# Patient Record
Sex: FEMALE | Race: WHITE | NOT HISPANIC OR LATINO | Employment: FULL TIME | URBAN - METROPOLITAN AREA
[De-identification: names, ages, dates, MRNs, and addresses within clinical notes are randomized per-mention and may not be internally consistent; named-entity substitution may affect disease eponyms.]

---

## 2017-04-26 ENCOUNTER — TELEPHONE (OUTPATIENT)
Dept: INTERNAL MEDICINE | Facility: CLINIC | Age: 52
End: 2017-04-26

## 2017-04-26 NOTE — TELEPHONE ENCOUNTER
----- Message from Iona Barajas sent at 4/26/2017  3:47 PM EDT -----  PT NEEDS ESTRODIAL PATCH PLEASE SEND TO [facility] SHE TOLD PHARMACY SHE BROUGHT ONE IN [facility]

## 2017-04-27 ENCOUNTER — TELEPHONE (OUTPATIENT)
Dept: INTERNAL MEDICINE | Facility: CLINIC | Age: 52
End: 2017-04-27

## 2017-04-27 RX ORDER — ESTRADIOL 0.07 MG/D
1 FILM, EXTENDED RELEASE TRANSDERMAL 2 TIMES WEEKLY
Qty: 8 PATCH | Refills: 2 | Status: SHIPPED | OUTPATIENT
Start: 2017-04-27 | End: 2020-07-20 | Stop reason: ALTCHOICE

## 2017-04-27 NOTE — TELEPHONE ENCOUNTER
She said at her last appointment she spoke to you about getting the vivelle dot patch filled.  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  She will schedule an appointment, but it might me 3 months before she can get in.  I put the med in her chart.  Do you want to send in?

## 2017-04-27 NOTE — TELEPHONE ENCOUNTER
I sent in enough for 3 months, and if she can schedule a physical here, and also if she has not  Had her mammogram yet, she should get this scheduled

## 2017-06-02 ENCOUNTER — OFFICE VISIT (OUTPATIENT)
Dept: INTERNAL MEDICINE | Facility: CLINIC | Age: 52
End: 2017-06-02

## 2017-06-02 VITALS
HEIGHT: 63 IN | BODY MASS INDEX: 29.98 KG/M2 | SYSTOLIC BLOOD PRESSURE: 128 MMHG | TEMPERATURE: 98.6 F | DIASTOLIC BLOOD PRESSURE: 78 MMHG | WEIGHT: 169.2 LBS

## 2017-06-02 DIAGNOSIS — M79.671 FOOT PAIN, RIGHT: Primary | ICD-10-CM

## 2017-06-02 DIAGNOSIS — M54.2 NECK PAIN: ICD-10-CM

## 2017-06-02 DIAGNOSIS — H92.01 RIGHT EAR PAIN: ICD-10-CM

## 2017-06-02 DIAGNOSIS — F51.01 PRIMARY INSOMNIA: ICD-10-CM

## 2017-06-02 PROCEDURE — 99214 OFFICE O/P EST MOD 30 MIN: CPT | Performed by: INTERNAL MEDICINE

## 2017-06-02 RX ORDER — MOMETASONE FUROATE 50 UG/1
2 SPRAY, METERED NASAL DAILY
Qty: 3 EACH | Refills: 3 | Status: CANCELLED | OUTPATIENT
Start: 2017-06-02

## 2017-06-02 RX ORDER — METAXALONE 800 MG/1
800 TABLET ORAL 3 TIMES DAILY PRN
Qty: 270 TABLET | Refills: 1 | Status: CANCELLED | OUTPATIENT
Start: 2017-06-02

## 2017-06-02 RX ORDER — SUMATRIPTAN 100 MG/1
100 TABLET, FILM COATED ORAL
Qty: 27 TABLET | Refills: 3 | Status: CANCELLED | OUTPATIENT
Start: 2017-06-02

## 2017-06-02 RX ORDER — METAXALONE 800 MG/1
800 TABLET ORAL 3 TIMES DAILY PRN
Qty: 270 TABLET | Refills: 1 | Status: SHIPPED | OUTPATIENT
Start: 2017-06-02 | End: 2018-08-27 | Stop reason: SDUPTHER

## 2017-06-02 RX ORDER — ZOLPIDEM TARTRATE 10 MG/1
10 TABLET ORAL NIGHTLY PRN
Qty: 30 TABLET | Refills: 2 | Status: SHIPPED | OUTPATIENT
Start: 2017-06-02 | End: 2018-08-27 | Stop reason: SDUPTHER

## 2017-06-02 NOTE — PROGRESS NOTES
"Subjective   La Hernandez is a 51 y.o. female.     History of Present Illness      She has had R ear pain for the last week. Will feel a sharp pain intermittently. She did get some water in ear and so was worried she might have infection    Insomnia - needs refill on ambien. Does not take every night. Does help when she takes. Some nights will take 1/2. No caffeine in afternoon/evenings    R foot pain - started 2 months ago. Initially she felt it was from stepping off curb. She has had pain medially close to heel and feels there is some swelling below medial ankle. She has been trying acupuncture. Pain is constant when she is walking or weight-bearing. Not any worse when she first gets up, worse the more she is on it.   She starts a class at [employer] tomorrow and will have to walk a lot. She had a right ankle fracture several years ago. No otc's. Initially was more swollen so has come down. She does have appt at Dr Rollins's office at the end of the month    The following portions of the patient's history were reviewed and updated as appropriate: allergies, current medications, past family history, past medical history, past social history, past surgical history and problem list.    Review of Systems   Constitutional: Negative for activity change, appetite change and unexpected weight change.   HENT: Positive for ear pain.    Musculoskeletal: Positive for arthralgias, joint swelling and neck pain.   Allergic/Immunologic: Positive for environmental allergies. Negative for immunocompromised state.   Neurological: Positive for headaches.   Psychiatric/Behavioral: Positive for sleep disturbance.       Objective   Blood pressure 128/78, temperature 98.6 °F (37 °C), temperature source Temporal Artery , height 63\" (160 cm), weight 169 lb 3.2 oz (76.7 kg).  Physical Exam   Constitutional: She is oriented to person, place, and time. She appears well-developed and well-nourished. No distress.   HENT:   Head: Normocephalic and " atraumatic.   Right Ear: External ear normal.   Left Ear: External ear normal.   R ext canal - just slightly erythematous. Tm is clear   Eyes: Conjunctivae and EOM are normal.   Cardiovascular: Normal rate, regular rhythm and normal heart sounds.  Exam reveals no gallop and no friction rub.    No murmur heard.  Pulmonary/Chest: Effort normal and breath sounds normal. No respiratory distress. She has no wheezes.   Musculoskeletal: She exhibits tenderness (r foot - tender just past the calcaneus.no ankle tenderness, no achilles tenderness. no pain w/ dorsi/plantarflexion or eversion/inversion). She exhibits no edema.   Neurological: She is alert and oriented to person, place, and time.   Skin: Skin is warm and dry. No rash noted. She is not diaphoretic. No erythema.   Psychiatric: She has a normal mood and affect. Her behavior is normal. Judgment and thought content normal.       Assessment/Plan   La was seen today for earache, right foot pain and swelling and med refill.    Diagnoses and all orders for this visit:    Foot pain, right  -     XR Foot 3+ View Right    Neck pain  -     metaxalone (SKELAXIN) 800 MG tablet; Take 1 tablet by mouth 3 (Three) Times a Day As Needed for Muscle Spasms.    Primary insomnia - Pt has already signed controlled substance contract. Jeff done today and appropriate. UDS due  -     zolpidem (AMBIEN) 10 MG tablet; Take 1 tablet by mouth At Night As Needed for Sleep.    Right ear pain - she will call if this worsens over weekend and I will call in some drops

## 2017-06-08 ENCOUNTER — HOSPITAL ENCOUNTER (OUTPATIENT)
Dept: GENERAL RADIOLOGY | Facility: HOSPITAL | Age: 52
Discharge: HOME | End: 2017-06-08

## 2017-06-30 ENCOUNTER — OFFICE VISIT (OUTPATIENT)
Dept: ORTHOPEDIC SURGERY | Facility: CLINIC | Age: 52
End: 2017-06-30

## 2017-06-30 VITALS
SYSTOLIC BLOOD PRESSURE: 151 MMHG | DIASTOLIC BLOOD PRESSURE: 72 MMHG | HEIGHT: 63 IN | WEIGHT: 164 LBS | BODY MASS INDEX: 29.06 KG/M2 | HEART RATE: 76 BPM

## 2017-06-30 DIAGNOSIS — M72.2 PLANTAR FASCIITIS OF RIGHT FOOT: Primary | ICD-10-CM

## 2017-06-30 PROCEDURE — 99203 OFFICE O/P NEW LOW 30 MIN: CPT | Performed by: PHYSICIAN ASSISTANT

## 2017-06-30 NOTE — PROGRESS NOTES
Subjective     Pain of the Right Foot      La Hernandez is a 52 y.o. female.     History of Present Illness   This is a very pleasant 43 yo female here to discuss her right heel pain.  She complains of pain for 3 months.  No trauma or injury. She has some radiating pain into the arch.  No swelling, warmth, erythema.  Pain is worst after driving long distances and getting out of the car.   She describes the pain as sharp and 6/10. She has treated with shoe wear change. She wore a 3D walker that she had for previous injury which did not help her pain.  She has been night splinting. She begins school in 6 weeks and needs to have improved pain by then.  Allergies   Allergen Reactions   • Bactrim [Sulfamethoxazole-Trimethoprim] Hives   • Glen Plus [Aspirin Buffered] Hives   • Biaxin Oral Suspension [Clarithromycin] Other (See Comments)     Burning mouth   • Hydrocodone-Ibuprofen Hives   • Valium [Diazepam] Hives   • Wellbutrin [Bupropion] Hives   • Penicillin V Potassium [Penicillin V] Rash     Current Outpatient Prescriptions on File Prior to Visit   Medication Sig Dispense Refill   • desloratadine (CLARINEX) 5 MG tablet Take 1 tablet by mouth Daily. 90 tablet 3   • EPINEPHrine (EPIPEN) 0.3 MG/0.3ML solution auto-injector injection Inject 0.3 mg under the skin 1 (One) Time.     • estradiol (MINIVELLE, VIVELLE-DOT) 0.075 MG/24HR patch Place 1 patch on the skin 2 (Two) Times a Week. 8 patch 2   • metaxalone (SKELAXIN) 800 MG tablet Take 1 tablet by mouth 3 (Three) Times a Day As Needed for Muscle Spasms. 270 tablet 1   • SUMAtriptan (IMITREX) 100 MG tablet Take 1 tablet by mouth Every 2 (Two) Hours As Needed for migraine. 27 tablet 3   • zolpidem (AMBIEN) 10 MG tablet Take 1 tablet by mouth At Night As Needed for Sleep. 30 tablet 2   • [DISCONTINUED] mometasone (NASONEX) 50 MCG/ACT nasal spray 2 sprays into each nostril Daily. 3 each 3   • [DISCONTINUED] omeprazole-sodium bicarbonate (ZEGERID)  MG per  capsule Take 1 capsule by mouth Every Morning Before Breakfast. 90 capsule 3     No current facility-administered medications on file prior to visit.      Social History     Social History   • Marital status:      Spouse name: N/A   • Number of children: N/A   • Years of education: N/A     Occupational History   • Not on file.     Social History Main Topics   • Smoking status: Never Smoker   • Smokeless tobacco: Never Used   • Alcohol use Yes      Comment: rare   • Drug use: No   • Sexual activity: Defer     Other Topics Concern   • Not on file     Social History Narrative     Past Surgical History:   Procedure Laterality Date   • CHOLECYSTECTOMY     • ENDOSCOPY  2007    Hiatal hernia, small polyp, gastritis   • REDUCTION MAMMAPLASTY       Family History   Problem Relation Age of Onset   • Hyperlipidemia Mother    • Hypertension Mother    • Other Mother      DDD   • Osteoarthritis Mother    • Asthma Mother    • Allergies Mother    • Cancer Mother    • Multiple myeloma Father    • Stroke Father    • Coronary artery disease Father 71   • Cancer Father    • Stomach cancer Maternal Grandfather    • Colon cancer Other 55   • Diabetes Other      Past Medical History:   Diagnosis Date   • DDD (degenerative disc disease), cervical 03/2013    c spine x-ray, c5-c6, c6 -c7 narrowing, osteophytes   • Encounter for IUD removal     embedded in uterus   • Endometriosis    • Endometriosis 1999    s/p lap   • Gastritis    • H. pylori infection 2007    by serum, s/p tx - 2009 neg breath test   • H/O mammogram 08/2015    L mass - need US - [place], annual screening   • History of CT scan 2007    pancreas fullness - no change 1/2008, 9/2009   • History of MRI 12/2014    Cspine - diffuse spondylosis   • Pap smear for cervical cancer screening 05/28/2013         Review of Systems   Constitutional: Negative.    HENT: Negative.    Eyes: Negative.    Respiratory: Negative.    Cardiovascular: Negative.    Gastrointestinal: Negative.   "  Endocrine: Negative.    Genitourinary: Negative.    Musculoskeletal: Positive for arthralgias.   Skin: Negative.    Allergic/Immunologic: Positive for environmental allergies and food allergies.   Neurological: Negative.    Hematological: Negative.    Psychiatric/Behavioral: Negative.        The following portions of the patient's history were reviewed and updated as appropriate: allergies, current medications, past family history, past medical history, past social history, past surgical history and problem list.    Objective   /72  Pulse 76  Ht 63\" (160 cm)  Wt 164 lb (74.4 kg)  BMI 29.05 kg/m2    Physical Exam:   GENERAL: Body habitus: normal weight for height    Lower extremity edema: Left: none; Right: none    Varicose veins:  Left: none; Right: none    Gait: normal     Mental Status:  awake and alert; oriented to person, place, and time    Voice:  clear  SKIN:  Normal    Hair Growth:  Right:normal; Left:  normal  HEENT: Head: Normocephalic, no lesions, without obvious abnormality.     Eyes: sclera anicteric  PULM:  Repiratory effort normal    Ortho Exam  V:  Dorsalis Pedis:  Right: 2+; Left:2+    Posterior Tibial: Right:2+; Left:2+    Capillary Refill:  Brisk  MSK:  Hand:right handed      Tibia:  Right:  non tender; Left:  non tender      Ankle:  Right: non tender; Left:  non tender      Foot:  Right:  tender over the origin of the plantar fascia and into arch; Left:  non tender      NEURO: Heel Walking:  Right:  normal; Left:  normal    Toe Walking:  Right:  normal; Left:  normal     Gadsden-Sanya 5.07 monofilament test: normal    Lower extremity sensation: intact     Calf Atrophy:none    Motor Function: all 5/5          Medical Decision Making    Data Review:   none    Assessment and Plan/ Diagnosis/Treatment options:   Right plantar fasciitis.  I explained plantar fasciitis in detail to the patient.  I explained to the bow-string mechanism of the plantar fascia.  I went over the current " "research of the [employer] with them.  I explained the treatments that were not statistically significant in improving the problem including: injection of steroids, special orthotics, special shoes, surgery, medication, ice, not working, etc.    I explained the treatments that are statistically significant at improving the problem.  These include stretching/night splinting, casting, PRP, OssaTron.    I explained the most important treatment: Stretching and night splinting.  I went over the patient information sheet with them, I showed them the stretches and they were able to reproduce them.  I emphasized the \"toe pull\" as the most important stretch.  They need to do the stretches 5 repetitions each, 6-8 times per day.  I explained how to do them at work, at home, before getting out of bed, before getting out of the car, and before getting up from a chair.    She has a night splint at home already.    If they do not improve after 4 months of aggressive stretching and night splinting, the next step will be a short leg fiberglass walking cast worn for 6 weeks.    · Preprinted education was provided to the patient.     She will begin an aggressive stretching and night splinting routine and return in 4 months for casting if needed.      .            "

## 2017-11-27 RX ORDER — DESLORATADINE 5 MG/1
TABLET ORAL
Qty: 90 TABLET | Refills: 1 | Status: SHIPPED | OUTPATIENT
Start: 2017-11-27 | End: 2018-05-29 | Stop reason: SDUPTHER

## 2018-02-02 RX ORDER — SUMATRIPTAN 100 MG/1
TABLET, FILM COATED ORAL
Qty: 12 TABLET | Refills: 4 | Status: SHIPPED | OUTPATIENT
Start: 2018-02-02 | End: 2018-07-10 | Stop reason: SDUPTHER

## 2018-05-29 RX ORDER — DESLORATADINE 5 MG/1
TABLET ORAL
Qty: 30 TABLET | Refills: 0 | Status: SHIPPED | OUTPATIENT
Start: 2018-05-29 | End: 2018-08-08 | Stop reason: SDUPTHER

## 2018-07-02 RX ORDER — DESLORATADINE 5 MG/1
TABLET ORAL
Qty: 30 TABLET | Refills: 0 | OUTPATIENT
Start: 2018-07-02

## 2018-07-09 RX ORDER — DESLORATADINE 5 MG/1
TABLET ORAL
Qty: 30 TABLET | Refills: 0 | Status: SHIPPED | OUTPATIENT
Start: 2018-07-09 | End: 2018-08-08 | Stop reason: SDUPTHER

## 2018-07-10 RX ORDER — SUMATRIPTAN 100 MG/1
TABLET, FILM COATED ORAL
Qty: 12 TABLET | Refills: 0 | Status: SHIPPED | OUTPATIENT
Start: 2018-07-10 | End: 2018-08-08 | Stop reason: SDUPTHER

## 2018-08-08 ENCOUNTER — TELEPHONE (OUTPATIENT)
Dept: INTERNAL MEDICINE | Facility: CLINIC | Age: 53
End: 2018-08-08

## 2018-08-08 RX ORDER — DESLORATADINE 5 MG/1
5 TABLET ORAL DAILY
Qty: 30 TABLET | Refills: 0 | Status: SHIPPED | OUTPATIENT
Start: 2018-08-08 | End: 2018-08-27 | Stop reason: SDUPTHER

## 2018-08-08 RX ORDER — SUMATRIPTAN 100 MG/1
100 TABLET, FILM COATED ORAL ONCE AS NEEDED
Qty: 12 TABLET | Refills: 0 | Status: SHIPPED | OUTPATIENT
Start: 2018-08-08 | End: 2018-08-27 | Stop reason: SDUPTHER

## 2018-08-08 NOTE — TELEPHONE ENCOUNTER
Patient made apt on 8/27 to get refills she was offered earlier appointments than the 27th but could not come due to her schedule. So she is asking for refills to do her until this appointment on the 27th. SUMAtriptan (IMITREX) 100 MG tablet,desloratadine (CLARINEX) 5 MG tablet . She also wants us to call her if this can be done.

## 2018-08-13 RX ORDER — DESLORATADINE 5 MG/1
TABLET ORAL
Qty: 30 TABLET | Refills: 0 | OUTPATIENT
Start: 2018-08-13

## 2018-08-26 NOTE — PROGRESS NOTES
Subjective   La Hernandez is a 53 y.o. female.     History of Present Illness     Here for f/u on:    Allergies -needs refills  on clarinex and nasonex. She is out of nasonex and using astepro instead, but likes the nasonex better.she tried holding the clarinex as she felt a lot of dryness in her nasal passages but didn't help.has tried sinus rinses but didn't work. She has seen ENT in the past but has been a few years - she didn't feel they could help her  Keeps epipen on hand as well and needs refill. She is allergic to cabbage and some other foods    Insomnia - has used ambien in the past. She has tried melatonin and benadryl in the past and didn't help that much. When she took the ambien she did not feel hungover. Does not use often. Last refilled last year    Migraines - the HAs seem to come from neck if the neck pain gets out of control. imitrex does help when she takes, and wants to take daily as she feels she needs it daily.  She uses skelaxin 1/day for her neck, which also does help. She says she had tried elavil - made her drowsy. topamax - didn't feel well w/ this. Gabapentin - didn't like. Injections - didn't help.  She had seen Dr Cabrera in past - no help;  Saw pain management and did injections in neck - no help    Neck pain on left side that radiates into head - uses skelaxin daily.  She had seen ortho and NS in past but didn't recommend surgery. She also sees acupuncturist every 2 weeks which helps some    GERD - on omeprazole that she takes about every 3 days and works well; tries to watch diet      The following portions of the patient's history were reviewed and updated as appropriate: allergies, current medications, past family history, past medical history, past social history, past surgical history and problem list.    Review of Systems   Constitutional: Negative for unexpected weight gain and unexpected weight loss.   HENT: Positive for congestion, ear pain (R), sinus pressure and  "tinnitus (R). Negative for sore throat.    Gastrointestinal: Negative for constipation and diarrhea.   Musculoskeletal: Positive for neck pain.   Allergic/Immunologic: Positive for environmental allergies. Negative for immunocompromised state.   Neurological: Positive for headache. Negative for seizures and syncope.   Psychiatric/Behavioral: Positive for sleep disturbance and stress.       Objective    Vitals:    08/27/18 1535   BP: 124/74   BP Location: Left arm   Pulse: 81   Temp: 98.8 °F (37.1 °C)   TempSrc: Temporal Artery    SpO2: 95%   Weight: 73 kg (161 lb)   Height: 160 cm (63\")     Physical Exam   Constitutional: She is oriented to person, place, and time. She appears well-developed and well-nourished. No distress.   HENT:   Head: Normocephalic and atraumatic.   Right Ear: External ear normal.   Left Ear: External ear normal.   Mouth/Throat: Oropharynx is clear and moist. No oropharyngeal exudate.   L maxillary sinus tender   Eyes: Pupils are equal, round, and reactive to light. EOM are normal.   Neck: Normal range of motion. Neck supple.   Cardiovascular: Normal rate and regular rhythm.    Pulmonary/Chest: Effort normal and breath sounds normal.   Abdominal: Soft. Bowel sounds are normal.   Musculoskeletal: She exhibits tenderness (over left trap. no cspinet enderness). She exhibits no edema.   Neurological: She is alert and oriented to person, place, and time. No cranial nerve deficit.   Skin: Skin is warm and dry. No rash noted. She is not diaphoretic.   Psychiatric: She has a normal mood and affect. Her behavior is normal. Judgment and thought content normal.         Assessment/Plan   La was seen today for follow-up and insomnia.    Diagnoses and all orders for this visit:      DDD (degenerative disc disease), cervical/ Neck pain, chronic - continue acupuncture and skelaxin. She declines seeing pain mment again  -     metaxalone (SKELAXIN) 800 MG tablet; 1 qd prn    Primary insomnia - uses ambien " infrequentlyControlled substance contract reviewed and signed by pt. Adverse effects and risks of addiction of medication reviewed with pt. Jeff done today and appropriate.   -     zolpidem (AMBIEN) 10 MG tablet; Take 1 tablet by mouth At Night As Needed for Sleep.    Migraine without aura and without status migrainosus, not intractable - does not want to try any prophylactic meds again. Is willing to see neurology again to discuss options.   -     SUMAtriptan (IMITREX) 100 MG tablet; 1 qd prn headache; can repeat in 2 hrs if needed  -     Ambulatory Referral to Neurology    Gastroesophageal reflux disease, esophagitis presence not specified  -     omeprazole-sodium bicarbonate (ZEGERID)  MG per capsule; Take 1 capsule by mouth Every Morning Before Breakfast.      Chronic seasonal allergic rhinitis, unspecified trigger  -     EPINEPHrine (EPIPEN) 0.3 MG/0.3ML solution auto-injector injection; Inject 0.3 mL under the skin into the appropriate area as directed 1 (One) Time for 1 dose.  -     desloratadine (CLARINEX) 5 MG tablet; Take 1 tablet by mouth Daily.  -     mometasone (NASONEX) 50 MCG/ACT nasal spray; 2 sprays into the nostril(s) as directed by provider Daily.        Preventative - she will schedule her maritza through her GYN (RIVKA). paps through GYN in 2 weeks. Her colon is overdue - she wants to call back for this as she needs to find a   She wants to check w/ her GYN and get labs there as she is not fasting today and does want FLP done and has eaten today

## 2018-08-27 ENCOUNTER — OFFICE VISIT (OUTPATIENT)
Dept: INTERNAL MEDICINE | Facility: CLINIC | Age: 53
End: 2018-08-27

## 2018-08-27 VITALS
TEMPERATURE: 98.8 F | HEIGHT: 63 IN | DIASTOLIC BLOOD PRESSURE: 74 MMHG | HEART RATE: 81 BPM | OXYGEN SATURATION: 95 % | SYSTOLIC BLOOD PRESSURE: 124 MMHG | BODY MASS INDEX: 28.53 KG/M2 | WEIGHT: 161 LBS

## 2018-08-27 DIAGNOSIS — M54.2 NECK PAIN: Primary | ICD-10-CM

## 2018-08-27 DIAGNOSIS — G43.009 MIGRAINE WITHOUT AURA AND WITHOUT STATUS MIGRAINOSUS, NOT INTRACTABLE: ICD-10-CM

## 2018-08-27 DIAGNOSIS — K21.9 GASTROESOPHAGEAL REFLUX DISEASE, ESOPHAGITIS PRESENCE NOT SPECIFIED: ICD-10-CM

## 2018-08-27 DIAGNOSIS — J30.2 CHRONIC SEASONAL ALLERGIC RHINITIS, UNSPECIFIED TRIGGER: ICD-10-CM

## 2018-08-27 DIAGNOSIS — M50.30 DDD (DEGENERATIVE DISC DISEASE), CERVICAL: ICD-10-CM

## 2018-08-27 DIAGNOSIS — F51.01 PRIMARY INSOMNIA: ICD-10-CM

## 2018-08-27 PROCEDURE — 99214 OFFICE O/P EST MOD 30 MIN: CPT | Performed by: INTERNAL MEDICINE

## 2018-08-27 RX ORDER — METAXALONE 800 MG/1
TABLET ORAL
Qty: 90 TABLET | Refills: 3 | Status: SHIPPED | OUTPATIENT
Start: 2018-08-27 | End: 2020-04-06 | Stop reason: SDUPTHER

## 2018-08-27 RX ORDER — ZOLPIDEM TARTRATE 10 MG/1
10 TABLET ORAL NIGHTLY PRN
Qty: 30 TABLET | Refills: 2 | Status: SHIPPED | OUTPATIENT
Start: 2018-08-27 | End: 2020-04-06 | Stop reason: SDUPTHER

## 2018-08-27 RX ORDER — DESLORATADINE 5 MG/1
5 TABLET ORAL DAILY
Qty: 30 TABLET | Refills: 11 | Status: SHIPPED | OUTPATIENT
Start: 2018-08-27 | End: 2019-10-07 | Stop reason: SDUPTHER

## 2018-08-27 RX ORDER — OMEPRAZOLE AND SODIUM BICARBONATE 40; 1100 MG/1; MG/1
1 CAPSULE ORAL
Qty: 30 CAPSULE | Refills: 11 | Status: SHIPPED | OUTPATIENT
Start: 2018-08-27 | End: 2020-04-06 | Stop reason: SDUPTHER

## 2018-08-27 RX ORDER — EPINEPHRINE 0.3 MG/.3ML
0.3 INJECTION SUBCUTANEOUS ONCE
Qty: 1 EACH | Refills: 11 | Status: SHIPPED | OUTPATIENT
Start: 2018-08-27 | End: 2018-08-27

## 2018-08-27 RX ORDER — MOMETASONE FUROATE 50 UG/1
2 SPRAY, METERED NASAL DAILY
Qty: 1 EACH | Refills: 11 | Status: SHIPPED | OUTPATIENT
Start: 2018-08-27 | End: 2020-04-06 | Stop reason: SDUPTHER

## 2018-08-27 RX ORDER — OMEPRAZOLE AND SODIUM BICARBONATE 40; 1100 MG/1; MG/1
1 CAPSULE ORAL
COMMUNITY
End: 2018-08-27 | Stop reason: SDUPTHER

## 2018-08-27 RX ORDER — SUMATRIPTAN 100 MG/1
TABLET, FILM COATED ORAL
Qty: 12 TABLET | Refills: 11 | Status: SHIPPED | OUTPATIENT
Start: 2018-08-27 | End: 2020-04-06

## 2018-08-27 RX ORDER — MOMETASONE FUROATE 50 UG/1
2 SPRAY, METERED NASAL DAILY
COMMUNITY
End: 2018-08-27 | Stop reason: SDUPTHER

## 2018-10-30 ENCOUNTER — OFFICE VISIT (OUTPATIENT)
Dept: NEUROLOGY | Facility: CLINIC | Age: 53
End: 2018-10-30

## 2018-10-30 VITALS
SYSTOLIC BLOOD PRESSURE: 127 MMHG | DIASTOLIC BLOOD PRESSURE: 82 MMHG | BODY MASS INDEX: 28.53 KG/M2 | HEIGHT: 63 IN | WEIGHT: 161 LBS

## 2018-10-30 DIAGNOSIS — R51.9 OCCIPITAL HEADACHE: Primary | ICD-10-CM

## 2018-10-30 PROCEDURE — 99204 OFFICE O/P NEW MOD 45 MIN: CPT | Performed by: PSYCHIATRY & NEUROLOGY

## 2018-10-30 NOTE — PROGRESS NOTES
Subjective:    CC: La Hernandez is seen today in consultation at the request of Vida Mooney MD for Migraine       HPI:  Patient is a 53-year-old female referred the clinic for evaluation of headaches.  She reports that she has had headaches for over 6 years now.  She reports that the headache starts in the left neck area and then would radiate to involve left occipital, vertex and then left frontotemporal region.  She describes pain as a dull aching type of pain or sometimes sharp shooting type of pain with maximum pain intensity being 6-7 out of 10.  She currently is reporting almost daily headaches.  She denies any associated light or sound sensitivity or nausea or vomiting with these headaches.  She has tried Elavil in the past for this headaches but it made her drowsy so it was stopped.  She developed side effects to gabapentin and hence it was discontinued as well.  She has had epidural steroid injection in cervical spine but it did not help with headaches as well.  She developed side effects with Topamax use in the past.  She currently uses muscle relaxer and it helps somewhat and uses sumatriptan 100 mg as needed and that has helped with headache as well.  She reports that her sleep quality is fair.  She denies any depressive symptoms.    The following portions of the patient's history were reviewed today and updated as of 10/30/2018  : allergies, social history and problem list.  This document will be scanned to patient's chart.      Current Outpatient Prescriptions:   •  SUMAtriptan (IMITREX) 100 MG tablet, 1 qd prn headache; can repeat in 2 hrs if needed, Disp: 12 tablet, Rfl: 11  •  desloratadine (CLARINEX) 5 MG tablet, Take 1 tablet by mouth Daily., Disp: 30 tablet, Rfl: 11  •  estradiol (MINIVELLE, VIVELLE-DOT) 0.075 MG/24HR patch, Place 1 patch on the skin 2 (Two) Times a Week., Disp: 8 patch, Rfl: 2  •  metaxalone (SKELAXIN) 800 MG tablet, 1 qd prn, Disp: 90 tablet, Rfl: 3  •  mometasone  (NASONEX) 50 MCG/ACT nasal spray, 2 sprays into the nostril(s) as directed by provider Daily., Disp: 1 each, Rfl: 11  •  MULTIPLE VITAMIN PO, Take  by mouth., Disp: , Rfl:   •  omeprazole-sodium bicarbonate (ZEGERID)  MG per capsule, Take 1 capsule by mouth Every Morning Before Breakfast., Disp: 30 capsule, Rfl: 11  •  zolpidem (AMBIEN) 10 MG tablet, Take 1 tablet by mouth At Night As Needed for Sleep., Disp: 30 tablet, Rfl: 2   Past Medical History:   Diagnosis Date   • Allergic rhinitis    • DDD (degenerative disc disease), cervical 03/2013    c spine x-ray, c5-c6, c6 -c7 narrowing, osteophytes   • Encounter for IUD removal     embedded in uterus   • Endometriosis 1999    s/p lap   • Gastritis 2007    by EGD   • GERD (gastroesophageal reflux disease)    • H. pylori infection 2007    by serum, s/p tx - 2009 neg breath test   • H/O mammogram 08/2015    L mass - need US - [place], annual screening   • H/O mammogram 10/27/2016   • History of CT scan 2007    pancreas fullness - no change 1/2008, 9/2009   • History of MRI 12/2014    Cspine - diffuse spondylosis   • Migraines    • Pap smear for cervical cancer screening 09/2017    Dr. De Leon      Past Surgical History:   Procedure Laterality Date   • CHOLECYSTECTOMY     • ENDOSCOPY  2007    Hiatal hernia, small polyp, gastritis   • REDUCTION MAMMAPLASTY        Family History   Problem Relation Age of Onset   • Hyperlipidemia Mother    • Hypertension Mother    • Other Mother         DDD   • Osteoarthritis Mother    • Asthma Mother    • Allergies Mother    • Cancer Mother    • Multiple myeloma Father    • Stroke Father    • Coronary artery disease Father 71   • Cancer Father    • Stomach cancer Maternal Grandfather    • Colon cancer Other 55   • Diabetes Other       Review of Systems   HENT: Positive for rhinorrhea.    Eyes: Positive for pain and visual disturbance.   Respiratory: Negative.    Cardiovascular: Negative.    Musculoskeletal: Positive for back pain, joint  "swelling, neck pain and neck stiffness.   Neurological: Positive for dizziness and headache.       All other systems reviewed and are negative     Objective:    /82   Ht 160 cm (62.99\")   Wt 73 kg (161 lb)   BMI 28.53 kg/m²     Neurology Exam:    General apperance: NAD.     Mental status: Alert, awake and oriented to time place and person.    Recent and Remote memory: Can recall 3/3 objects at 5 minutes. Can recall historical events.     Attention span and Concentration: Serial 7s: Normal.     Fund of knowledge:  Normal.     Language and Speech: No aphasia or dysarthria.    Naming , Repitition and Comprehension:  Can name objects, repeat a sentence and follow commands. Speech is clear and fluent with good repetition, comprehension, and naming.    Cranial Nerves:   CN II: Visual fields are full. Intact. Fundi - Normal, No papillederma, Pupils - MAITE  CN III, IV and VI: Extraocular movements are intact. Normal saccades.   CN V: Facial sensation is intact.   CN VII: Muscles of facial expression reveal no asymmetry. Intact.   CN VIII: Hearing is intact. Whispered voice intact.   CN IX and X: Palate elevates symmetrically. Intact  CN XI: Shoulder shrug is intact.   CN XII: Tongue is midline without evidence of atrophy or fasciculation.     Motor:  Right UE muscle strength 5/5. Normal tone.     Left UE muscle strength 5/5. Normal tone.      Right LE muscle strength5/5. Normal tone.     Left LE muscle strength 5/5. Normal tone.      Sensory: Normal light touch, vibration and pinprick sensation bilaterally.    DTRs: 2+ bilaterally in upper and lower extremities.    Babinski: Negative bilaterally.    Co-ordination: Normal finger-to-nose, heel to shin B/L.    Rhomberg: Negative.    Gait: Normal.    Cardiovascular: Regular rate and rhythm without murmur, gallop or rub.    Assessment and Plan:  1. Occipital headache  Patient with  occipital headaches occurring almost on a daily basis.  She should benefit the most from " occipital nerve block.  She'll be scheduled for occipital nerve block in a week's time.  If no response with occipital nerve block then Lyrica, Effexor or Cymbalta can be tried as a preventative therapy.  I've advised her not to use Imitrex more than 2 times in a week and no more than twice in a very 4 hour period.       Return in about 1 week (around 11/6/2018) for Occipital Nerve Block.     Micheal Koo MD

## 2018-11-07 ENCOUNTER — OFFICE VISIT (OUTPATIENT)
Dept: NEUROLOGY | Facility: CLINIC | Age: 53
End: 2018-11-07

## 2018-11-07 ENCOUNTER — TELEPHONE (OUTPATIENT)
Dept: NEUROLOGY | Facility: CLINIC | Age: 53
End: 2018-11-07

## 2018-11-07 VITALS
BODY MASS INDEX: 28.53 KG/M2 | DIASTOLIC BLOOD PRESSURE: 84 MMHG | SYSTOLIC BLOOD PRESSURE: 128 MMHG | HEIGHT: 63 IN | WEIGHT: 161 LBS

## 2018-11-07 DIAGNOSIS — R51.9 OCCIPITAL HEADACHE: Primary | ICD-10-CM

## 2018-11-07 PROCEDURE — 64450 NJX AA&/STRD OTHER PN/BRANCH: CPT | Performed by: PSYCHIATRY & NEUROLOGY

## 2018-11-07 PROCEDURE — 64405 NJX AA&/STRD GR OCPL NRV: CPT | Performed by: PSYCHIATRY & NEUROLOGY

## 2018-11-07 NOTE — TELEPHONE ENCOUNTER
----- Message from Marc Byers sent at 11/7/2018  9:16 AM EST -----  Contact: PT  MICKI:    PATIENT IS ALLERGIC TO NOVOCAIN MIXED WITH EPINEPHRINE, AND IS CONCERNED THE NUMBING CREAM USED FOR THE PROCEDURE WILL CAUSE A REACTION. WANTS TO KNOW WHAT TO DO.     PT CALLBACK: [phone]

## 2018-11-07 NOTE — TELEPHONE ENCOUNTER
Notified  of pt concern, he would like to discuss oral medication with pt at follow up today. I contacted pt notified her of Dr. Koo instructions she verbalized understanding and will cb if needed.

## 2018-12-21 ENCOUNTER — TELEPHONE (OUTPATIENT)
Dept: INTERNAL MEDICINE | Facility: CLINIC | Age: 53
End: 2018-12-21

## 2018-12-23 DIAGNOSIS — J30.2 SEASONAL ALLERGIC RHINITIS, UNSPECIFIED TRIGGER: Primary | ICD-10-CM

## 2019-10-07 DIAGNOSIS — J30.2 CHRONIC SEASONAL ALLERGIC RHINITIS: ICD-10-CM

## 2019-10-07 RX ORDER — DESLORATADINE 5 MG/1
TABLET ORAL
Qty: 30 TABLET | Refills: 0 | Status: SHIPPED | OUTPATIENT
Start: 2019-10-07 | End: 2019-11-10 | Stop reason: SDUPTHER

## 2019-11-10 DIAGNOSIS — J30.2 CHRONIC SEASONAL ALLERGIC RHINITIS: ICD-10-CM

## 2019-11-11 RX ORDER — DESLORATADINE 5 MG/1
TABLET ORAL
Qty: 30 TABLET | Refills: 0 | Status: SHIPPED | OUTPATIENT
Start: 2019-11-11 | End: 2020-04-06 | Stop reason: SDUPTHER

## 2019-11-11 NOTE — TELEPHONE ENCOUNTER
I have let her know she needs an appointment for med refill.  After this fill she will have enough until December and will try to make an appointment then.

## 2019-12-08 DIAGNOSIS — J30.2 CHRONIC SEASONAL ALLERGIC RHINITIS: ICD-10-CM

## 2019-12-09 RX ORDER — DESLORATADINE 5 MG/1
TABLET ORAL
Qty: 30 TABLET | Refills: 0 | OUTPATIENT
Start: 2019-12-09

## 2020-04-06 ENCOUNTER — OFFICE VISIT (OUTPATIENT)
Dept: INTERNAL MEDICINE | Facility: CLINIC | Age: 55
End: 2020-04-06

## 2020-04-06 DIAGNOSIS — J30.2 CHRONIC SEASONAL ALLERGIC RHINITIS: ICD-10-CM

## 2020-04-06 DIAGNOSIS — G43.009 MIGRAINE WITHOUT AURA AND WITHOUT STATUS MIGRAINOSUS, NOT INTRACTABLE: ICD-10-CM

## 2020-04-06 DIAGNOSIS — M54.2 NECK PAIN: ICD-10-CM

## 2020-04-06 DIAGNOSIS — J30.9 ALLERGIC RHINITIS, UNSPECIFIED SEASONALITY, UNSPECIFIED TRIGGER: Primary | ICD-10-CM

## 2020-04-06 DIAGNOSIS — K21.9 GASTROESOPHAGEAL REFLUX DISEASE, ESOPHAGITIS PRESENCE NOT SPECIFIED: ICD-10-CM

## 2020-04-06 DIAGNOSIS — F51.01 PRIMARY INSOMNIA: ICD-10-CM

## 2020-04-06 PROCEDURE — 99213 OFFICE O/P EST LOW 20 MIN: CPT | Performed by: INTERNAL MEDICINE

## 2020-04-06 RX ORDER — MONTELUKAST SODIUM 10 MG/1
10 TABLET ORAL NIGHTLY
COMMUNITY
End: 2020-04-06 | Stop reason: SDUPTHER

## 2020-04-06 RX ORDER — ALBUTEROL SULFATE 90 UG/1
2 AEROSOL, METERED RESPIRATORY (INHALATION) EVERY 4 HOURS PRN
Qty: 1 INHALER | Refills: 5 | Status: SHIPPED | OUTPATIENT
Start: 2020-04-06 | End: 2021-01-21 | Stop reason: SDUPTHER

## 2020-04-06 RX ORDER — MONTELUKAST SODIUM 10 MG/1
10 TABLET ORAL NIGHTLY
Qty: 90 TABLET | Refills: 1 | Status: SHIPPED | OUTPATIENT
Start: 2020-04-06 | End: 2020-11-30

## 2020-04-06 RX ORDER — MOMETASONE FUROATE 50 UG/1
2 SPRAY, METERED NASAL DAILY
Qty: 1 EACH | Refills: 5 | Status: SHIPPED | OUTPATIENT
Start: 2020-04-06 | End: 2021-01-21 | Stop reason: SDUPTHER

## 2020-04-06 RX ORDER — RIZATRIPTAN BENZOATE 10 MG/1
10 TABLET ORAL ONCE AS NEEDED
Qty: 9 TABLET | Refills: 2 | Status: SHIPPED | OUTPATIENT
Start: 2020-04-06 | End: 2020-07-20 | Stop reason: SDUPTHER

## 2020-04-06 RX ORDER — METAXALONE 800 MG/1
TABLET ORAL
Qty: 90 TABLET | Refills: 1 | Status: SHIPPED | OUTPATIENT
Start: 2020-04-06 | End: 2021-01-21 | Stop reason: SDUPTHER

## 2020-04-06 RX ORDER — OMEPRAZOLE AND SODIUM BICARBONATE 40; 1100 MG/1; MG/1
1 CAPSULE ORAL
Qty: 90 CAPSULE | Refills: 1 | Status: SHIPPED | OUTPATIENT
Start: 2020-04-06 | End: 2021-01-21 | Stop reason: SDUPTHER

## 2020-04-06 RX ORDER — ZOLPIDEM TARTRATE 10 MG/1
10 TABLET ORAL NIGHTLY PRN
Qty: 30 TABLET | Refills: 2 | Status: SHIPPED | OUTPATIENT
Start: 2020-04-06 | End: 2020-07-20 | Stop reason: SDUPTHER

## 2020-04-06 RX ORDER — DESLORATADINE 5 MG/1
5 TABLET ORAL DAILY
Qty: 90 TABLET | Refills: 1 | Status: SHIPPED | OUTPATIENT
Start: 2020-04-06 | End: 2021-01-21 | Stop reason: SDUPTHER

## 2020-04-06 NOTE — PROGRESS NOTES
Doris Hernandez is a 54 y.o. female.     History of Present Illness     Telephone visit performed today. Total visit time was 12 minutes    Allergies- she has had more symptoms over the last week with the trees and flowers blooming. She has had some wheezing on and off, some drainage, itchy eyes, itchy ear. She is using benadryl, clarinex, and singulair. Does sinus rinses as needed. Also using flonase daily, but does feel nasonex has worked better for her in the past. She has used proair in past, but it has  and needs a refill  She has had some water damage in her house and wondering if there now is some mold, as she feels her symptoms are worse inside  She did see Dr Mckeon at [facility] in , and she referred her to ENT because at the time, her symptoms were felt to be more chronic sinusitis. She did then see Dr Anupam lomeli , and he recommneded sinus surgery, but pt did not want to do, and symptoms did eventually improve. She feels her current symptoms are more allergy related rather than sinusitis.    Insomnia - she uses ambien as needed; has not filled in about a year, but would like a refill    Migraines - she has tried maxalt and seemed to cause less sleepiness than imitrex, so would like Rx for it. The HA seem to be triggered by her neck pain. The neck pain had been under good control w/ regular acupuncture, but is unable to see acupuncturist w/ COVID-, so she worries the HA frequency may go up. Currently about 1 HA a week    Neck pain- on left side that radiates into head - uses skelaxin daily, and does need refill.  She had seen ortho and NS in past but didn't recommend surgery    GERD-uses Zegerid as needed    Current Outpatient Medications on File Prior to Visit   Medication Sig Dispense Refill   • desloratadine (CLARINEX) 5 MG tablet TAKE ONE TABLET BY MOUTH DAILY 30 tablet 0   • estradiol (MINIVELLE, VIVELLE-DOT) 0.075 MG/24HR patch Place 1 patch on the skin 2 (Two) Times a  Week. 8 patch 2   • metaxalone (SKELAXIN) 800 MG tablet 1 qd prn 90 tablet 3   • mometasone (NASONEX) 50 MCG/ACT nasal spray 2 sprays into the nostril(s) as directed by provider Daily. 1 each 11   • montelukast (SINGULAIR) 10 MG tablet Take 10 mg by mouth Every Night.     • MULTIPLE VITAMIN PO Take  by mouth.     • omeprazole-sodium bicarbonate (ZEGERID)  MG per capsule Take 1 capsule by mouth Every Morning Before Breakfast. 30 capsule 11   • zolpidem (AMBIEN) 10 MG tablet Take 1 tablet by mouth At Night As Needed for Sleep. 30 tablet 2   • SUMAtriptan (IMITREX) 100 MG tablet 1 qd prn headache; can repeat in 2 hrs if needed 12 tablet 11     No current facility-administered medications on file prior to visit.        The following portions of the patient's history were reviewed and updated as appropriate: allergies, current medications, past family history, past medical history, past social history, past surgical history and problem list.    Review of Systems   Constitutional: Negative for fever.   HENT: Positive for postnasal drip and sore throat. Negative for congestion, rhinorrhea, sinus pressure and voice change.    Eyes: Positive for itching.   Respiratory: Positive for cough (slight) and wheezing. Negative for shortness of breath and stridor.    Gastrointestinal: Positive for GERD.   Musculoskeletal: Positive for neck pain and neck stiffness.   Allergic/Immunologic: Positive for environmental allergies and food allergies. Negative for immunocompromised state.   Neurological: Positive for headache. Negative for confusion.   Psychiatric/Behavioral: Positive for sleep disturbance and stress.       Objective   There were no vitals taken for this visit.  Physical Exam   Constitutional: She is oriented to person, place, and time. No distress.   Neurological: She is alert and oriented to person, place, and time.   Psychiatric: She has a normal mood and affect. Her behavior is normal. Judgment and thought content  normal.         Assessment/Plan   La was seen today for allergies, migraine, med refill and insomnia.    Diagnoses and all orders for this visit:    Allergic rhinitis, unspecified seasonality, unspecified trigger - worse in last week w/ increase in pollen, and possibly mold exposure. We will restart Nasonex instead of Flonase since this is worked better.  Continue Singulair and Clarinex.  Add albuterol inhaler as needed.  Call if no better  -     mometasone (Nasonex) 50 MCG/ACT nasal spray; 2 sprays into the nostril(s) as directed by provider Daily.  -     desloratadine (CLARINEX) 5 MG tablet; Take 1 tablet by mouth Daily.  -     montelukast (SINGULAIR) 10 MG tablet; Take 1 tablet by mouth Every Night.  -     albuterol sulfate  (90 Base) MCG/ACT inhaler; Inhale 2 puffs Every 4 (Four) Hours As Needed for Wheezing.    Primary insomnia -uses Ambien infrequently.  Refilled today pt has already signed controlled substance contract. Jeff done today and appropriate.  -     zolpidem (AMBIEN) 10 MG tablet; Take 1 tablet by mouth At Night As Needed for Sleep.    Gastroesophageal reflux disease, esophagitis presence not specified  -     omeprazole-sodium bicarbonate (ZEGERID)  MG per capsule; Take 1 capsule by mouth Every Morning Before Breakfast.    Neck pain-uses Skelaxin as needed  -     metaxalone (SKELAXIN) 800 MG tablet; 1 qd prn        Migraine without aura and without status migrainosus, not intractable-she would like to try Maxalt.  We will send in  -     rizatriptan (Maxalt) 10 MG tablet; Take 1 tablet by mouth 1 (One) Time As Needed for Migraine for up to 1 dose. May repeat in 2 hours if needed                Preventative- I asked her to schedule a physical once routine exams are allowed again

## 2020-04-07 ENCOUNTER — TELEPHONE (OUTPATIENT)
Dept: INTERNAL MEDICINE | Facility: CLINIC | Age: 55
End: 2020-04-07

## 2020-04-07 NOTE — TELEPHONE ENCOUNTER
LMOVM at [facility] has been approved by her insurance.  Approval from 4/7/2020 - 4/7/2021.

## 2020-07-18 NOTE — PROGRESS NOTES
Subjective   La Hernandez is a 55 y.o. female.     History of Present Illness     Here for f/u on:    Allergies- she  is using benadryl, clarinex, and singulair. Does sinus rinses as needed. Also using flonase daily, but does feel nasonex has worked better for her in the past.  She does need a refill on her EpiPen  uses proair occasionally but has not needed that much because she did get the mold taken care of in her house.     Insomnia - she uses ambien as needed - filled it just once in 4/20 and has a few left.  She would like to go ahead and get a refill today     Migraines -uses maxalt as needed. The HA seem to be triggered by her neck pain. The neck pain had been under good control w/ regular acupuncture - the acupunturist is still closed but will open again in September, and she does have an appointment     Neck pain- on left side that radiates into head - uses skelaxin daily She had seen ortho and NS in past but didn't recommend surgery.  She will go back with acupuncture as soon as it is open     GERD-uses Zegerid as needed    Hyperlipidemia - has been high in the past; not fasting today    Breasts hurt B -has fibrocystic changes and she does feel like breast have enlarged recently.  She had a breast reduction in the past.  She does feel like the breast contributes some to her neck pain as well.  Her last mammogram was 12/19.    Current Outpatient Medications on File Prior to Visit   Medication Sig Dispense Refill   • albuterol sulfate  (90 Base) MCG/ACT inhaler Inhale 2 puffs Every 4 (Four) Hours As Needed for Wheezing. 1 inhaler 5   • desloratadine (CLARINEX) 5 MG tablet Take 1 tablet by mouth Daily. 90 tablet 1   • estradiol (MINIVELLE, VIVELLE-DOT) 0.075 MG/24HR patch Place 1 patch on the skin 2 (Two) Times a Week. 8 patch 2   • metaxalone (SKELAXIN) 800 MG tablet 1 qd prn 90 tablet 1   • mometasone (Nasonex) 50 MCG/ACT nasal spray 2 sprays into the nostril(s) as directed by provider Daily.  "1 each 5   • montelukast (SINGULAIR) 10 MG tablet Take 1 tablet by mouth Every Night. 90 tablet 1   • MULTIPLE VITAMIN PO Take  by mouth.     • omeprazole-sodium bicarbonate (ZEGERID)  MG per capsule Take 1 capsule by mouth Every Morning Before Breakfast. 90 capsule 1   • rizatriptan (Maxalt) 10 MG tablet Take 1 tablet by mouth 1 (One) Time As Needed for Migraine for up to 1 dose. May repeat in 2 hours if needed 9 tablet 2   • zolpidem (AMBIEN) 10 MG tablet Take 1 tablet by mouth At Night As Needed for Sleep. 30 tablet 2     No current facility-administered medications on file prior to visit.        The following portions of the patient's history were reviewed and updated as appropriate: allergies, current medications, past family history, past medical history, past social history, past surgical history and problem list.    Review of Systems   Constitutional: Positive for fatigue. Negative for unexpected weight gain and unexpected weight loss.   Musculoskeletal: Positive for neck pain.   Allergic/Immunologic: Positive for environmental allergies.   Neurological: Positive for headache (from neck).       Objective   /74 (BP Location: Left arm, Patient Position: Sitting)   Pulse 88   Temp 98 °F (36.7 °C) (Infrared)   Ht 160 cm (63\")   Wt 74 kg (163 lb 3.2 oz)   SpO2 99%   BMI 28.91 kg/m²   Physical Exam   Constitutional: She is oriented to person, place, and time. She appears well-developed and well-nourished. No distress.   HENT:   Head: Normocephalic and atraumatic.   Eyes: Pupils are equal, round, and reactive to light. Conjunctivae and EOM are normal. Right eye exhibits no discharge. Left eye exhibits no discharge.   Neck: Normal range of motion. Neck supple.   Cardiovascular: Normal rate and regular rhythm.   Pulmonary/Chest: Effort normal and breath sounds normal.   Breast exam - left breast - no masses felt   Musculoskeletal: She exhibits no edema.   Neurological: She is alert and oriented to " person, place, and time. No cranial nerve deficit.   Skin: Skin is warm and dry. No rash noted. She is not diaphoretic.   Psychiatric: She has a normal mood and affect. Her behavior is normal. Judgment and thought content normal.   Nursing note and vitals reviewed.        Assessment/Plan   La was seen today for insomnia, allergies, migraine and med refill.    Diagnoses and all orders for this visit:    Migraine without aura and without status migrainosus, not intractable  -     CBC (No Diff); Future  -     Comprehensive Metabolic Panel; Future  -     TSH; Future  -     rizatriptan (Maxalt) 10 MG tablet; Take 1 tablet by mouth 1 (One) Time As Needed for Migraine for up to 1 dose. May repeat in 2 hours if needed    Seasonal allergic rhinitis, unspecified trigger-EpiPen sent in    Gastroesophageal reflux disease, esophagitis presence not specified-stable on Zegerid    DDD (degenerative disc disease), cervical-she will get back in with acupuncture as soon as she can since this has worked the best for her    Primary insomnia-uses Ambien just occasionally.Pt has already signed controlled substance contract. Jeff done today and appropriate.   -     zolpidem (AMBIEN) 10 MG tablet; Take 1 tablet by mouth At Night As Needed for Sleep.    Colon cancer screening -not want to do a colonoscopy yet, mainly because of difficulty finding a ride, but is willing to do Cologuard  -     Cologuard - Stool, Per Rectum; Future    Other orders  -     EPINEPHrine (EPIPEN) 0.3 MG/0.3ML solution auto-injector injection; Inject 0.3 mL under the skin into the appropriate area as directed 1 (One) Time for 1 dose.    Breast pain-history of fibrocystic breast disease.  Exam today normal.  Discussed doing self breast exams monthly and letting us know if there is any change.  She will be due for her screening mammogram in 12/20.  Encouraged cutting back on caffeine and could try vitamin E again

## 2020-07-20 ENCOUNTER — OFFICE VISIT (OUTPATIENT)
Dept: INTERNAL MEDICINE | Facility: CLINIC | Age: 55
End: 2020-07-20

## 2020-07-20 ENCOUNTER — TELEPHONE (OUTPATIENT)
Dept: INTERNAL MEDICINE | Facility: CLINIC | Age: 55
End: 2020-07-20

## 2020-07-20 ENCOUNTER — LAB (OUTPATIENT)
Dept: LAB | Facility: HOSPITAL | Age: 55
End: 2020-07-20

## 2020-07-20 ENCOUNTER — RESULTS ENCOUNTER (OUTPATIENT)
Dept: INTERNAL MEDICINE | Facility: CLINIC | Age: 55
End: 2020-07-20

## 2020-07-20 VITALS
HEIGHT: 63 IN | BODY MASS INDEX: 28.92 KG/M2 | OXYGEN SATURATION: 99 % | TEMPERATURE: 98 F | DIASTOLIC BLOOD PRESSURE: 74 MMHG | WEIGHT: 163.2 LBS | SYSTOLIC BLOOD PRESSURE: 132 MMHG | HEART RATE: 88 BPM

## 2020-07-20 DIAGNOSIS — J30.2 SEASONAL ALLERGIC RHINITIS, UNSPECIFIED TRIGGER: ICD-10-CM

## 2020-07-20 DIAGNOSIS — K21.9 GASTROESOPHAGEAL REFLUX DISEASE, ESOPHAGITIS PRESENCE NOT SPECIFIED: ICD-10-CM

## 2020-07-20 DIAGNOSIS — G43.009 MIGRAINE WITHOUT AURA AND WITHOUT STATUS MIGRAINOSUS, NOT INTRACTABLE: ICD-10-CM

## 2020-07-20 DIAGNOSIS — M50.30 DDD (DEGENERATIVE DISC DISEASE), CERVICAL: ICD-10-CM

## 2020-07-20 DIAGNOSIS — N64.4 BREAST PAIN: ICD-10-CM

## 2020-07-20 DIAGNOSIS — Z12.11 COLON CANCER SCREENING: ICD-10-CM

## 2020-07-20 DIAGNOSIS — G43.009 MIGRAINE WITHOUT AURA AND WITHOUT STATUS MIGRAINOSUS, NOT INTRACTABLE: Primary | ICD-10-CM

## 2020-07-20 DIAGNOSIS — F51.01 PRIMARY INSOMNIA: ICD-10-CM

## 2020-07-20 PROCEDURE — 84443 ASSAY THYROID STIM HORMONE: CPT

## 2020-07-20 PROCEDURE — 85027 COMPLETE CBC AUTOMATED: CPT

## 2020-07-20 PROCEDURE — 99214 OFFICE O/P EST MOD 30 MIN: CPT | Performed by: INTERNAL MEDICINE

## 2020-07-20 PROCEDURE — 80053 COMPREHEN METABOLIC PANEL: CPT

## 2020-07-20 RX ORDER — ZOLPIDEM TARTRATE 10 MG/1
10 TABLET ORAL NIGHTLY PRN
Qty: 30 TABLET | Refills: 2 | Status: CANCELLED | OUTPATIENT
Start: 2020-07-20

## 2020-07-20 RX ORDER — ESTRADIOL 0.04 MG/D
1 FILM, EXTENDED RELEASE TRANSDERMAL 2 TIMES WEEKLY
COMMUNITY
Start: 2020-05-20

## 2020-07-20 RX ORDER — RIZATRIPTAN BENZOATE 10 MG/1
10 TABLET ORAL ONCE AS NEEDED
Qty: 9 TABLET | Refills: 11 | Status: SHIPPED | OUTPATIENT
Start: 2020-07-20 | End: 2021-09-29

## 2020-07-20 RX ORDER — ZOLPIDEM TARTRATE 10 MG/1
10 TABLET ORAL NIGHTLY PRN
Qty: 30 TABLET | Refills: 2 | Status: SHIPPED | OUTPATIENT
Start: 2020-07-20 | End: 2021-01-21 | Stop reason: SDUPTHER

## 2020-07-20 RX ORDER — EPINEPHRINE 0.3 MG/.3ML
0.3 INJECTION SUBCUTANEOUS ONCE
Qty: 1 EACH | Refills: 0 | Status: SHIPPED | OUTPATIENT
Start: 2020-07-20 | End: 2020-07-20

## 2020-07-20 RX ORDER — RIZATRIPTAN BENZOATE 10 MG/1
10 TABLET ORAL ONCE AS NEEDED
Qty: 18 TABLET | Refills: 2 | Status: CANCELLED | OUTPATIENT
Start: 2020-07-20

## 2020-07-20 RX ORDER — PSEUDOEPHEDRINE HCL 30 MG
60 TABLET ORAL DAILY
COMMUNITY

## 2020-07-20 NOTE — TELEPHONE ENCOUNTER
Patient stated at checkout that she has forgotten to ask you about an EpiPen... I told her I would send you a message about it in hopes that you can give her some sort of answer. Please let me know what I can do to relay the message to her.

## 2020-07-21 LAB
ALBUMIN SERPL-MCNC: 4.2 G/DL (ref 3.5–5.2)
ALBUMIN/GLOB SERPL: 1.3 G/DL
ALP SERPL-CCNC: 95 U/L (ref 39–117)
ALT SERPL W P-5'-P-CCNC: 27 U/L (ref 1–33)
ANION GAP SERPL CALCULATED.3IONS-SCNC: 12.7 MMOL/L (ref 5–15)
AST SERPL-CCNC: 26 U/L (ref 1–32)
BILIRUB SERPL-MCNC: 0.3 MG/DL (ref 0–1.2)
BUN SERPL-MCNC: 19 MG/DL (ref 6–20)
BUN/CREAT SERPL: 17.9 (ref 7–25)
CALCIUM SPEC-SCNC: 9.7 MG/DL (ref 8.6–10.5)
CHLORIDE SERPL-SCNC: 102 MMOL/L (ref 98–107)
CO2 SERPL-SCNC: 24.3 MMOL/L (ref 22–29)
CREAT SERPL-MCNC: 1.06 MG/DL (ref 0.57–1)
DEPRECATED RDW RBC AUTO: 40.4 FL (ref 37–54)
ERYTHROCYTE [DISTWIDTH] IN BLOOD BY AUTOMATED COUNT: 12.3 % (ref 12.3–15.4)
GFR SERPL CREATININE-BSD FRML MDRD: 53 ML/MIN/1.73
GLOBULIN UR ELPH-MCNC: 3.2 GM/DL
GLUCOSE SERPL-MCNC: 84 MG/DL (ref 65–99)
HCT VFR BLD AUTO: 47.1 % (ref 34–46.6)
HGB BLD-MCNC: 16.3 G/DL (ref 12–15.9)
MCH RBC QN AUTO: 30.8 PG (ref 26.6–33)
MCHC RBC AUTO-ENTMCNC: 34.6 G/DL (ref 31.5–35.7)
MCV RBC AUTO: 88.9 FL (ref 79–97)
PLATELET # BLD AUTO: 283 10*3/MM3 (ref 140–450)
PMV BLD AUTO: 11.2 FL (ref 6–12)
POTASSIUM SERPL-SCNC: 4 MMOL/L (ref 3.5–5.2)
PROT SERPL-MCNC: 7.4 G/DL (ref 6–8.5)
RBC # BLD AUTO: 5.3 10*6/MM3 (ref 3.77–5.28)
SODIUM SERPL-SCNC: 139 MMOL/L (ref 136–145)
TSH SERPL DL<=0.05 MIU/L-ACNC: 1.66 UIU/ML (ref 0.27–4.2)
WBC # BLD AUTO: 7.18 10*3/MM3 (ref 3.4–10.8)

## 2020-11-29 DIAGNOSIS — J30.9 ALLERGIC RHINITIS, UNSPECIFIED SEASONALITY, UNSPECIFIED TRIGGER: ICD-10-CM

## 2020-11-30 RX ORDER — MONTELUKAST SODIUM 10 MG/1
TABLET ORAL
Qty: 90 TABLET | Refills: 1 | Status: SHIPPED | OUTPATIENT
Start: 2020-11-30 | End: 2021-01-21 | Stop reason: SDUPTHER

## 2021-01-21 ENCOUNTER — OFFICE VISIT (OUTPATIENT)
Dept: INTERNAL MEDICINE | Facility: CLINIC | Age: 56
End: 2021-01-21

## 2021-01-21 VITALS
SYSTOLIC BLOOD PRESSURE: 142 MMHG | HEIGHT: 63 IN | DIASTOLIC BLOOD PRESSURE: 66 MMHG | HEART RATE: 105 BPM | WEIGHT: 165.4 LBS | TEMPERATURE: 97.7 F | OXYGEN SATURATION: 98 % | BODY MASS INDEX: 29.3 KG/M2

## 2021-01-21 DIAGNOSIS — Z00.00 ROUTINE GENERAL MEDICAL EXAMINATION AT A HEALTH CARE FACILITY: Primary | ICD-10-CM

## 2021-01-21 DIAGNOSIS — J30.9 ALLERGIC RHINITIS, UNSPECIFIED SEASONALITY, UNSPECIFIED TRIGGER: ICD-10-CM

## 2021-01-21 DIAGNOSIS — M54.2 NECK PAIN: ICD-10-CM

## 2021-01-21 DIAGNOSIS — K21.9 GASTROESOPHAGEAL REFLUX DISEASE WITHOUT ESOPHAGITIS: ICD-10-CM

## 2021-01-21 DIAGNOSIS — E55.9 VITAMIN D DEFICIENCY: ICD-10-CM

## 2021-01-21 DIAGNOSIS — M50.30 DDD (DEGENERATIVE DISC DISEASE), CERVICAL: ICD-10-CM

## 2021-01-21 DIAGNOSIS — G43.009 MIGRAINE WITHOUT AURA AND WITHOUT STATUS MIGRAINOSUS, NOT INTRACTABLE: ICD-10-CM

## 2021-01-21 DIAGNOSIS — J30.2 SEASONAL ALLERGIC RHINITIS, UNSPECIFIED TRIGGER: ICD-10-CM

## 2021-01-21 DIAGNOSIS — F51.01 PRIMARY INSOMNIA: ICD-10-CM

## 2021-01-21 LAB
BILIRUB BLD-MCNC: NEGATIVE MG/DL
CLARITY, POC: CLEAR
COLOR UR: YELLOW
GLUCOSE UR STRIP-MCNC: NEGATIVE MG/DL
KETONES UR QL: NEGATIVE
LEUKOCYTE EST, POC: NEGATIVE
NITRITE UR-MCNC: NEGATIVE MG/ML
PH UR: 6 [PH] (ref 5–8)
PROT UR STRIP-MCNC: NEGATIVE MG/DL
RBC # UR STRIP: NEGATIVE /UL
SP GR UR: 1.01 (ref 1–1.03)
UROBILINOGEN UR QL: NORMAL

## 2021-01-21 PROCEDURE — 99396 PREV VISIT EST AGE 40-64: CPT | Performed by: INTERNAL MEDICINE

## 2021-01-21 PROCEDURE — 81003 URINALYSIS AUTO W/O SCOPE: CPT | Performed by: INTERNAL MEDICINE

## 2021-01-21 RX ORDER — METAXALONE 800 MG/1
TABLET ORAL
Qty: 90 TABLET | Refills: 3 | Status: SHIPPED | OUTPATIENT
Start: 2021-01-21 | End: 2022-01-26 | Stop reason: SDUPTHER

## 2021-01-21 RX ORDER — MOMETASONE FUROATE 50 UG/1
2 SPRAY, METERED NASAL DAILY
Qty: 1 EACH | Refills: 5 | Status: SHIPPED | OUTPATIENT
Start: 2021-01-21 | End: 2022-01-26 | Stop reason: SDUPTHER

## 2021-01-21 RX ORDER — OMEPRAZOLE AND SODIUM BICARBONATE 40; 1100 MG/1; MG/1
1 CAPSULE ORAL
Qty: 90 CAPSULE | Refills: 3 | Status: SHIPPED | OUTPATIENT
Start: 2021-01-21 | End: 2022-01-26 | Stop reason: SDUPTHER

## 2021-01-21 RX ORDER — DESLORATADINE 5 MG/1
5 TABLET ORAL DAILY
Qty: 90 TABLET | Refills: 3 | Status: SHIPPED | OUTPATIENT
Start: 2021-01-21 | End: 2022-01-17

## 2021-01-21 RX ORDER — MONTELUKAST SODIUM 10 MG/1
10 TABLET ORAL
Qty: 90 TABLET | Refills: 3 | Status: SHIPPED | OUTPATIENT
Start: 2021-01-21 | End: 2022-01-26 | Stop reason: SDUPTHER

## 2021-01-21 RX ORDER — ZOLPIDEM TARTRATE 10 MG/1
10 TABLET ORAL NIGHTLY PRN
Qty: 15 TABLET | Refills: 2 | Status: SHIPPED | OUTPATIENT
Start: 2021-01-21 | End: 2022-01-26 | Stop reason: SDUPTHER

## 2021-01-21 RX ORDER — ALBUTEROL SULFATE 90 UG/1
2 AEROSOL, METERED RESPIRATORY (INHALATION) EVERY 4 HOURS PRN
Qty: 18 G | Refills: 11 | Status: SHIPPED | OUTPATIENT
Start: 2021-01-21 | End: 2023-01-30 | Stop reason: SDUPTHER

## 2021-01-21 NOTE — PROGRESS NOTES
Here for physical    Exercise: some walking at work but otherwise none.  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 Not a lot of red meat/fried food; on vit D ~5000/day, MVI    Elevated BP today - no h/o HTN.  She was rushing to get here on time so she feels like it is from stress    Neck pain w/ some radiation into left shoulder and some pain w/ raising her arm.  She sees her acupuncturist regularly which does help.  She would like to get another breast reduction as her size has gone back up to double D.  Her previous breast reduction was in '04 with the doctor that has retired       Current Outpatient Medications:   •  albuterol sulfate  (90 Base) MCG/ACT inhaler, Inhale 2 puffs Every 4 (Four) Hours As Needed for Wheezing., Disp: 1 inhaler, Rfl: 5  •  desloratadine (CLARINEX) 5 MG tablet, Take 1 tablet by mouth Daily., Disp: 90 tablet, Rfl: 1  •  estradiol (VIVELLE-DOT) 0.0375 MG/24HR patch, 1 patch 2 (Two) Times a Week., Disp: , Rfl:   •  Levonorgestrel 13.5 MG intrauterine device IUD, 1 each by Intrauterine route 1 (One) Time., Disp: , Rfl:   •  metaxalone (SKELAXIN) 800 MG tablet, 1 qd prn, Disp: 90 tablet, Rfl: 1  •  mometasone (Nasonex) 50 MCG/ACT nasal spray, 2 sprays into the nostril(s) as directed by provider Daily., Disp: 1 each, Rfl: 5  •  montelukast (SINGULAIR) 10 MG tablet, TAKE ONE TABLET BY MOUTH EVERY NIGHT, Disp: 90 tablet, Rfl: 1  •  MULTIPLE VITAMIN PO, Take  by mouth., Disp: , Rfl:   •  omeprazole-sodium bicarbonate (ZEGERID)  MG per capsule, Take 1 capsule by mouth Every Morning Before Breakfast., Disp: 90 capsule, Rfl: 1  •  pseudoephedrine (SUDAFED) 30 MG tablet, Take 60 mg by mouth Every 4 (Four) Hours As Needed for Congestion., Disp: , Rfl:   •  rizatriptan (Maxalt) 10 MG tablet, Take 1 tablet by mouth 1 (One) Time As Needed for Migraine for up to  "1 dose. May repeat in 2 hours if needed, Disp: 9 tablet, Rfl: 11  •  zolpidem (AMBIEN) 10 MG tablet, Take 1 tablet by mouth At Night As Needed for Sleep., Disp: 30 tablet, Rfl: 2    The following portions of the patient's history were reviewed and updated as appropriate: allergies, current medications, past family history, past medical history, past social history, past surgical history and problem list.    Review of Systems   Constitutional: Positive for activity change and fatigue. Negative for appetite change, unexpected weight gain and unexpected weight loss.   Gastrointestinal: Positive for GERD and indigestion.   Musculoskeletal: Positive for arthralgias, back pain, myalgias, neck pain and neck stiffness.   Allergic/Immunologic: Positive for environmental allergies.   Neurological: Positive for headache (from neck). Negative for seizures and speech difficulty.   Psychiatric/Behavioral: Positive for stress. Negative for dysphoric mood and depressed mood.         Objective    /66 (BP Location: Right arm, Patient Position: Sitting)   Pulse 105   Temp 97.7 °F (36.5 °C) (Infrared)   Ht 159 cm (62.61\")   Wt 75 kg (165 lb 6.4 oz)   SpO2 98%   BMI 29.67 kg/m²   Physical Exam   Physical Exam  Vitals signs and nursing note reviewed.   Constitutional:       General: She is not in acute distress.     Appearance: She is well-developed. She is not diaphoretic.   HENT:      Head: Normocephalic and atraumatic.      Right Ear: External ear normal.      Left Ear: External ear normal.      Nose: Nose normal.      Mouth/Throat:      Pharynx: No oropharyngeal exudate.   Eyes:      General: No scleral icterus.        Right eye: No discharge.         Left eye: No discharge.      Conjunctiva/sclera: Conjunctivae normal.      Pupils: Pupils are equal, round, and reactive to light.   Neck:      Musculoskeletal: Normal range of motion and neck supple.      Thyroid: No thyromegaly.   Cardiovascular:      Rate and Rhythm: " Normal rate and regular rhythm.      Heart sounds: Normal heart sounds. No murmur. No friction rub. No gallop.    Pulmonary:      Effort: Pulmonary effort is normal. No respiratory distress.      Breath sounds: Normal breath sounds. No wheezing or rales.   Abdominal:      General: Bowel sounds are normal. There is no distension.      Palpations: Abdomen is soft. There is no mass.      Tenderness: There is no abdominal tenderness. There is no guarding or rebound.   Musculoskeletal:         General: No deformity.      Comments: Tender over C-spine and left trap.  Passive range of motion normal but some discomfort   Lymphadenopathy:      Cervical: No cervical adenopathy.   Skin:     General: Skin is warm and dry.      Coloration: Skin is not pale.      Findings: No erythema or rash.   Neurological:      Mental Status: She is alert and oriented to person, place, and time.      Coordination: Coordination normal.      Deep Tendon Reflexes: Reflexes normal.   Psychiatric:         Behavior: Behavior normal.         Thought Content: Thought content normal.         Judgment: Judgment normal.         Recheck 128/74  Assessment/Plan   Diagnoses and all orders for this visit:    1. Routine general medical examination at a health care facility (Primary)  -     POC Urinalysis Dipstick, Automated  Regular exercise/healthy diet. BSE q month. Sunscreen use encouraged. calcium intake reviewed. Check fasting labs-order given to patient  [place]over due 1/22  Colon due now - we had ordered cologuard last visit but she has not done yet. She does have and will try to figure out a time she can do  DT done a few years ago  Shingles- she had shingrix  Hep A - had  Sees GYN for paps  Flu vaccine - she had  She has signed up for covid-19 vaccine  2. Migraine without aura and without status migrainosus, not intractable stable    3. Seasonal allergic rhinitis, unspecified trigger stable    4. Primary insomnia-Ambien refilled, does not use often. Pt has  already signed controlled substance contract. Jeff done today and appropriate.     -     zolpidem (AMBIEN) 10 MG tablet; Take 1 tablet by mouth At Night As Needed for Sleep.  Dispense: 15 tablet; Refill: 2    5. DDD (degenerative disc disease), cervical-patient doing acupuncture regularly.  We did discuss breast reduction but she wants to hold for now.  She will call  if she needs a referral    6. Gastroesophageal reflux disease without esophagitis-stable on Zegerid  -     omeprazole-sodium bicarbonate (ZEGERID)  MG per capsule; Take 1 capsule by mouth Every Morning Before Breakfast.  Dispense: 90 capsule; Refill: 3    7. Vitamin D deficiency-order for labs given to patient    8. Allergic rhinitis, unspecified seasonality, unspecified trigger  -     desloratadine (CLARINEX) 5 MG tablet; Take 1 tablet by mouth Daily.  Dispense: 90 tablet; Refill: 3  -     mometasone (Nasonex) 50 MCG/ACT nasal spray; 2 sprays into the nostril(s) as directed by provider Daily.  Dispense: 1 each; Refill: 5  -     montelukast (SINGULAIR) 10 MG tablet; Take 1 tablet by mouth every night at bedtime.  Dispense: 90 tablet; Refill: 3    9. Neck pain  -     metaxalone (SKELAXIN) 800 MG tablet; 1 qd prn  Dispense: 90 tablet; Refill: 3

## 2021-01-26 ENCOUNTER — TELEPHONE (OUTPATIENT)
Dept: INTERNAL MEDICINE | Facility: CLINIC | Age: 56
End: 2021-01-26

## 2021-01-26 NOTE — TELEPHONE ENCOUNTER
LMOVM at pharmacy to let them know the PA on zegerid has been approved from 1/26/2021 - 1/26/2024.  LMOVM for patient also letting her know it was approved.

## 2021-05-05 NOTE — PROGRESS NOTES
CC: Occipital headaches.      Procedure note for occipital nerve block-The patient's chart was reviewed.  After obtaining verbal consent the patient was placed in a sitting position with his neck flexed and his chin touching his chest.  Both the left and right occipital areas were prepped with antiseptic solution and injected with bupivacaine 0.5% using a 27-gauge needle.  3 cc of bupivacaine was injected at the site of the greater occipital nerve on each side and 1 cc was injected in the lesser occipital nerve on each side.  Patient tolerated the procedure well.    Note: I will see her back in clinic in 3 weeks for follow-up.   How Severe Is Your Skin Lesion?: moderate Have Your Skin Lesions Been Treated?: not been treated Is This A New Presentation, Or A Follow-Up?: Skin Lesions

## 2021-09-28 DIAGNOSIS — G43.009 MIGRAINE WITHOUT AURA AND WITHOUT STATUS MIGRAINOSUS, NOT INTRACTABLE: ICD-10-CM

## 2021-09-29 ENCOUNTER — TELEPHONE (OUTPATIENT)
Dept: INTERNAL MEDICINE | Facility: CLINIC | Age: 56
End: 2021-09-29

## 2021-09-29 RX ORDER — RIZATRIPTAN BENZOATE 10 MG/1
TABLET ORAL
Qty: 9 TABLET | Refills: 3 | Status: SHIPPED | OUTPATIENT
Start: 2021-09-29 | End: 2022-01-26 | Stop reason: SDUPTHER

## 2021-09-29 NOTE — TELEPHONE ENCOUNTER
[facility] [place] [place] - [address] AT [facility] [address] - [phone]  - [phone]      Pharmacy representative name: FRANCOIS    Pharmacy representative phone number: [phone]    What medication are you calling in regards to: metaxalone (SKELAXIN) 800 MG tablet AND rizatriptan (MAXALT) 10 MG tablet    What question does the pharmacy have: PRESCRIPTIONS CANNOT BE TAKEN TOGETHER     Who is the provider that prescribed the medication: DRAKE     Additional notes:

## 2021-09-29 NOTE — TELEPHONE ENCOUNTER
I spoke with the pharmacy and they were very insistant on not taking the meds together.  I assured them I would call patient and speak with her.  I spoke with La and let her know she asked how far apart she should take the meds.  I spoke with Dr. Mooney and she said at least 4-6 hours apart.  Patient states she only takes the metaxolone at night and the other during the day.  She verbalized understanding.

## 2022-01-11 DIAGNOSIS — F51.01 PRIMARY INSOMNIA: ICD-10-CM

## 2022-01-12 RX ORDER — ZOLPIDEM TARTRATE 10 MG/1
TABLET ORAL
Qty: 15 TABLET | Refills: 0 | OUTPATIENT
Start: 2022-01-12

## 2022-01-16 DIAGNOSIS — J30.9 ALLERGIC RHINITIS, UNSPECIFIED SEASONALITY, UNSPECIFIED TRIGGER: ICD-10-CM

## 2022-01-17 RX ORDER — DESLORATADINE 5 MG/1
TABLET ORAL
Qty: 90 TABLET | Refills: 0 | Status: SHIPPED | OUTPATIENT
Start: 2022-01-17 | End: 2022-04-15

## 2022-01-23 NOTE — PROGRESS NOTES
Here for physical    Exercise:none, too busy.  working full time job and a part time job, as well as working on thesis. Hopes to be finished with her thesis soon  Diet - healthy overall.  Not a lot of red meat/fried food; on vit D ~5000/day, MVI, b12     Insomnia - uses ambien occasionally and helps her sleep. She does need a refill. No side effects with it    Chronic sinus symptoms-she has been on several rounds of antibiotics and steroids and she had a CT scan done w/ Dr Mcclain last month and was given doxycycline but did not help. She is supposed to f/u there but has been very busy so has not been able to so has not been able to get the results on the CT scan. Also supposed to have allergy testing.     Neck pain-chronic.  She sees her acupuncturist and massage which helps some. Needs refill on her muscle relaxer.  She would like to get another breast reduction as her size has gone back up to double D.  Her previous breast reduction was in '04 with the doctor that has retired     LBP - sees chiropractor had xr and was told she had some slippage.       Current Outpatient Medications:   •  albuterol sulfate  (90 Base) MCG/ACT inhaler, Inhale 2 puffs Every 4 (Four) Hours As Needed for Wheezing., Disp: 18 g, Rfl: 11  •  desloratadine (CLARINEX) 5 MG tablet, TAKE ONE TABLET BY MOUTH DAILY, Disp: 90 tablet, Rfl: 0  •  estradiol (VIVELLE-DOT) 0.0375 MG/24HR patch, 1 patch 2 (Two) Times a Week., Disp: , Rfl:   •  guaiFENesin (MUCINEX) 600 MG 12 hr tablet, Take 1,200 mg by mouth 2 (Two) Times a Day., Disp: , Rfl:   •  Levonorgestrel 13.5 MG intrauterine device IUD, 1 each by Intrauterine route 1 (One) Time., Disp: , Rfl:   •  metaxalone (SKELAXIN) 800 MG tablet, 1 qd prn, Disp: 90 tablet, Rfl: 3  •  mometasone (Nasonex) 50 MCG/ACT nasal spray, 2 sprays into the nostril(s) as directed by provider Daily., Disp: 1 each, Rfl: 11  •  montelukast (SINGULAIR) 10 MG tablet, Take 1 tablet by mouth every night at bedtime.,  Disp: 90 tablet, Rfl: 3  •  MULTIPLE VITAMIN PO, Take  by mouth., Disp: , Rfl:   •  omeprazole-sodium bicarbonate (ZEGERID)  MG per capsule, Take 1 capsule by mouth Every Morning Before Breakfast., Disp: 90 capsule, Rfl: 3  •  pseudoephedrine (SUDAFED) 30 MG tablet, Take 60 mg by mouth Daily., Disp: , Rfl:   •  rizatriptan (MAXALT) 10 MG tablet, Take 1 tablet by mouth 1 (One) Time for 1 dose. AT ONSET OF HEADACHE MAY REPEAT ONE TABLET IN 2 HOURS IF NEEDED, Disp: 12 tablet, Rfl: 3  •  zolpidem (AMBIEN) 10 MG tablet, Take 1 tablet by mouth At Night As Needed for Sleep., Disp: 15 tablet, Rfl: 2  •  vitamin D3 (vitamin d) 125 MCG (5000 UT) capsule capsule, Take 1 tablet by mouth., Disp: , Rfl:     The following portions of the patient's history were reviewed and updated as appropriate: allergies, current medications, past family history, past medical history, past social history, past surgical history and problem list.    Review of Systems   Constitutional: Negative for activity change, appetite change, fever, unexpected weight gain and unexpected weight loss.   HENT: Positive for congestion, postnasal drip and sinus pressure.    Eyes: Negative.    Respiratory: Negative for shortness of breath and wheezing.    Cardiovascular: Negative for chest pain, palpitations and leg swelling.   Gastrointestinal: Negative.    Endocrine: Negative.    Genitourinary: Negative for difficulty urinating and dysuria.   Musculoskeletal: Positive for back pain and neck pain.   Skin: Negative.    Allergic/Immunologic: Positive for environmental allergies. Negative for immunocompromised state.   Neurological: Positive for headache. Negative for seizures, speech difficulty, memory problem and confusion.   Hematological: Does not bruise/bleed easily.   Psychiatric/Behavioral: Positive for stress. Negative for agitation.         Objective    /66 (BP Location: Left arm, Patient Position: Sitting)   Pulse 103   Temp 97.3 °F (36.3 °C)  "(Infrared)   Ht 159 cm (62.61\")   Wt 76.6 kg (168 lb 12.8 oz)   SpO2 99%   BMI 30.28 kg/m²   Physical Exam   Physical Exam  Vitals and nursing note reviewed.   Constitutional:       General: She is not in acute distress.     Appearance: She is well-developed. She is not diaphoretic.   HENT:      Head: Normocephalic and atraumatic.      Right Ear: External ear normal.      Left Ear: External ear normal.      Nose: Nose normal.      Mouth/Throat:      Pharynx: No oropharyngeal exudate.   Eyes:      General: No scleral icterus.        Right eye: No discharge.         Left eye: No discharge.      Conjunctiva/sclera: Conjunctivae normal.      Pupils: Pupils are equal, round, and reactive to light.   Neck:      Thyroid: No thyromegaly.   Cardiovascular:      Rate and Rhythm: Normal rate and regular rhythm.      Heart sounds: Normal heart sounds. No murmur heard.  No friction rub. No gallop.    Pulmonary:      Effort: Pulmonary effort is normal. No respiratory distress.      Breath sounds: Normal breath sounds. No wheezing or rales.   Abdominal:      General: Bowel sounds are normal. There is no distension.      Palpations: Abdomen is soft. There is no mass.      Tenderness: There is no abdominal tenderness. There is no guarding or rebound.   Musculoskeletal:         General: No deformity. Normal range of motion.      Cervical back: Normal range of motion and neck supple.   Lymphadenopathy:      Cervical: No cervical adenopathy.   Skin:     General: Skin is warm and dry.      Coloration: Skin is not pale.      Findings: No erythema or rash.   Neurological:      Mental Status: She is alert and oriented to person, place, and time.      Coordination: Coordination normal.      Deep Tendon Reflexes: Reflexes normal.   Psychiatric:         Behavior: Behavior normal.         Thought Content: Thought content normal.         Judgment: Judgment normal.           Assessment/Plan   Diagnoses and all orders for this visit:    1. " Routine general medical examination at a health care facility (Primary)  -     POC Urinalysis Dipstick, Automated  Regular exercise/healthy diet. BSE q month. Sunscreen use encouraged. Check fasting labs-order given as she can do in [place]  [place] due - she will call to schedule  Colon due now - we had ordered cologuard last year but has not done. It has . She will call when she has time to do this and we Will reorder then so it does not  before she has a chance to do it  DT due in   Shingles- she had shingrix  Hep A - had  Sees GYN for paps (dr wilkes)  Flu vaccine -she will go for this   covid-19 vaccine- she had booster  2. Neck pain  -     metaxalone (SKELAXIN) 800 MG tablet; 1 qd prn  Dispense: 90 tablet; Refill: 3    3. Allergic rhinitis, unspecified seasonality, unspecified trigger  -     mometasone (Nasonex) 50 MCG/ACT nasal spray; 2 sprays into the nostril(s) as directed by provider Daily.  Dispense: 1 each; Refill: 11  -     montelukast (SINGULAIR) 10 MG tablet; Take 1 tablet by mouth every night at bedtime.  Dispense: 90 tablet; Refill: 3    4. Gastroesophageal reflux disease without esophagitis  -     omeprazole-sodium bicarbonate (ZEGERID)  MG per capsule; Take 1 capsule by mouth Every Morning Before Breakfast.  Dispense: 90 capsule; Refill: 3    5. Migraine without aura and without status migrainosus, not intractable  -     rizatriptan (MAXALT) 10 MG tablet; Take 1 tablet by mouth 1 (One) Time for 1 dose. AT ONSET OF HEADACHE MAY REPEAT ONE TABLET IN 2 HOURS IF NEEDED  Dispense: 12 tablet; Refill: 3    6. Primary insomnia  -     zolpidem (AMBIEN) 10 MG tablet; Take 1 tablet by mouth At Night As Needed for Sleep.  Dispense: 15 tablet; Refill: 2    Encouraged her to call Dr. Mcclain's office to get an appointment to go over her CT scan. I was not able to pull up the results of the CT scan today

## 2022-01-26 ENCOUNTER — OFFICE VISIT (OUTPATIENT)
Dept: INTERNAL MEDICINE | Facility: CLINIC | Age: 57
End: 2022-01-26

## 2022-01-26 VITALS
TEMPERATURE: 97.3 F | HEIGHT: 63 IN | BODY MASS INDEX: 29.91 KG/M2 | DIASTOLIC BLOOD PRESSURE: 66 MMHG | SYSTOLIC BLOOD PRESSURE: 118 MMHG | OXYGEN SATURATION: 99 % | HEART RATE: 103 BPM | WEIGHT: 168.8 LBS

## 2022-01-26 DIAGNOSIS — J30.9 ALLERGIC RHINITIS, UNSPECIFIED SEASONALITY, UNSPECIFIED TRIGGER: ICD-10-CM

## 2022-01-26 DIAGNOSIS — Z00.00 ROUTINE GENERAL MEDICAL EXAMINATION AT A HEALTH CARE FACILITY: Primary | ICD-10-CM

## 2022-01-26 DIAGNOSIS — M54.2 NECK PAIN: ICD-10-CM

## 2022-01-26 DIAGNOSIS — K21.9 GASTROESOPHAGEAL REFLUX DISEASE WITHOUT ESOPHAGITIS: ICD-10-CM

## 2022-01-26 DIAGNOSIS — F51.01 PRIMARY INSOMNIA: ICD-10-CM

## 2022-01-26 DIAGNOSIS — G43.009 MIGRAINE WITHOUT AURA AND WITHOUT STATUS MIGRAINOSUS, NOT INTRACTABLE: ICD-10-CM

## 2022-01-26 LAB
BILIRUB BLD-MCNC: NEGATIVE MG/DL
CLARITY, POC: CLEAR
COLOR UR: YELLOW
EXPIRATION DATE: NORMAL
GLUCOSE UR STRIP-MCNC: NEGATIVE MG/DL
KETONES UR QL: NEGATIVE
LEUKOCYTE EST, POC: NEGATIVE
Lab: NORMAL
NITRITE UR-MCNC: NEGATIVE MG/ML
PH UR: 7 [PH] (ref 5–8)
PROT UR STRIP-MCNC: NEGATIVE MG/DL
RBC # UR STRIP: NEGATIVE /UL
SP GR UR: 1.01 (ref 1–1.03)
UROBILINOGEN UR QL: NORMAL

## 2022-01-26 PROCEDURE — 81003 URINALYSIS AUTO W/O SCOPE: CPT | Performed by: INTERNAL MEDICINE

## 2022-01-26 PROCEDURE — 99396 PREV VISIT EST AGE 40-64: CPT | Performed by: INTERNAL MEDICINE

## 2022-01-26 RX ORDER — MOMETASONE FUROATE 50 UG/1
2 SPRAY, METERED NASAL DAILY
Qty: 1 EACH | Refills: 11 | Status: SHIPPED | OUTPATIENT
Start: 2022-01-26 | End: 2023-01-30 | Stop reason: SDUPTHER

## 2022-01-26 RX ORDER — GUAIFENESIN 600 MG/1
1200 TABLET, EXTENDED RELEASE ORAL 2 TIMES DAILY
COMMUNITY

## 2022-01-26 RX ORDER — MONTELUKAST SODIUM 10 MG/1
10 TABLET ORAL
Qty: 90 TABLET | Refills: 3 | Status: SHIPPED | OUTPATIENT
Start: 2022-01-26 | End: 2023-01-30 | Stop reason: SDUPTHER

## 2022-01-26 RX ORDER — RIZATRIPTAN BENZOATE 10 MG/1
10 TABLET ORAL ONCE
Qty: 12 TABLET | Refills: 3 | Status: SHIPPED | OUTPATIENT
Start: 2022-01-26 | End: 2023-01-30 | Stop reason: SDUPTHER

## 2022-01-26 RX ORDER — ZOLPIDEM TARTRATE 10 MG/1
10 TABLET ORAL NIGHTLY PRN
Qty: 15 TABLET | Refills: 2 | Status: SHIPPED | OUTPATIENT
Start: 2022-01-26

## 2022-01-26 RX ORDER — OMEPRAZOLE AND SODIUM BICARBONATE 40; 1100 MG/1; MG/1
1 CAPSULE ORAL
Qty: 90 CAPSULE | Refills: 3 | Status: SHIPPED | OUTPATIENT
Start: 2022-01-26 | End: 2023-01-30 | Stop reason: SDUPTHER

## 2022-01-26 RX ORDER — METAXALONE 800 MG/1
TABLET ORAL
Qty: 90 TABLET | Refills: 3 | Status: SHIPPED | OUTPATIENT
Start: 2022-01-26

## 2022-04-15 DIAGNOSIS — J30.9 ALLERGIC RHINITIS, UNSPECIFIED SEASONALITY, UNSPECIFIED TRIGGER: ICD-10-CM

## 2022-04-15 RX ORDER — DESLORATADINE 5 MG/1
TABLET ORAL
Qty: 90 TABLET | Refills: 3 | Status: SHIPPED | OUTPATIENT
Start: 2022-04-15

## 2022-12-16 ENCOUNTER — TELEPHONE (OUTPATIENT)
Dept: INTERNAL MEDICINE | Facility: CLINIC | Age: 57
End: 2022-12-16

## 2022-12-16 NOTE — TELEPHONE ENCOUNTER
[facility] for the patient to return our call, office number was provided      HUB TO READ:   I can send in Nystatin for the thrush but she would have to be seen here for the sinus infection. She could call [facility] that she saw too if she prefers

## 2022-12-16 NOTE — TELEPHONE ENCOUNTER
Caller: La Hernandez    Relationship: Self    Best call back number: [phone]    What was the call regarding: PATIENT WAS PRESCRIBED CEPHALEXIN AT AN URGENT TREATMENT CENTER FOR A SINUS INFECTION. PATIENT STATES THE SINUS INFECTION DID NOT GO AWAY AND NOT SHE HAS THRUSH. PATIENT IS REQUESTING A MOUTH RINSE. PATIENT ALSO STATES IF THERE IS ANYTHING THAT CAN BE PRESCRIBED FOR SINUS INFECTION SHE WOULD LIKE THAT AS WELL, HAS BEEN TAKING MUCINEX.     Do you require a callback: YES PLEASE    [facility] [id number] - [place]AVE[place] - [address] [facility] AT [facility] [address] - [phone]  - [phone]   [phone]

## 2022-12-20 NOTE — TELEPHONE ENCOUNTER
[facility] for the patient to return our call, office number was provided       HUB TO READ:   I can send in Nystatin for the thrush but she would have to be seen here for the sinus infection. She could call [facility] that she saw too if she prefers    Closing message until the patient returns our call. Nystatin already sent to the patient's pharmacy.

## 2023-01-30 ENCOUNTER — OFFICE VISIT (OUTPATIENT)
Dept: INTERNAL MEDICINE | Facility: CLINIC | Age: 58
End: 2023-01-30
Payer: COMMERCIAL

## 2023-01-30 ENCOUNTER — HOSPITAL ENCOUNTER (OUTPATIENT)
Dept: GENERAL RADIOLOGY | Facility: HOSPITAL | Age: 58
Discharge: HOME | End: 2023-01-30
Admitting: INTERNAL MEDICINE
Payer: COMMERCIAL

## 2023-01-30 VITALS
TEMPERATURE: 96.9 F | BODY MASS INDEX: 29.41 KG/M2 | HEART RATE: 78 BPM | OXYGEN SATURATION: 99 % | SYSTOLIC BLOOD PRESSURE: 122 MMHG | WEIGHT: 166 LBS | DIASTOLIC BLOOD PRESSURE: 80 MMHG | HEIGHT: 63 IN

## 2023-01-30 DIAGNOSIS — Z00.00 HEALTHCARE MAINTENANCE: Primary | ICD-10-CM

## 2023-01-30 DIAGNOSIS — Z51.89 ENCOUNTER FOR MONITORING OF PATIENT COMPLIANCE IN DRUG TREATMENT PROGRAM: ICD-10-CM

## 2023-01-30 DIAGNOSIS — M54.2 NECK PAIN: ICD-10-CM

## 2023-01-30 DIAGNOSIS — Z23 NEED FOR COVID-19 VACCINE: ICD-10-CM

## 2023-01-30 DIAGNOSIS — Z12.11 COLON CANCER SCREENING: ICD-10-CM

## 2023-01-30 DIAGNOSIS — M25.512 LEFT SHOULDER PAIN, UNSPECIFIED CHRONICITY: ICD-10-CM

## 2023-01-30 DIAGNOSIS — K21.9 GASTROESOPHAGEAL REFLUX DISEASE WITHOUT ESOPHAGITIS: ICD-10-CM

## 2023-01-30 DIAGNOSIS — J30.9 ALLERGIC RHINITIS, UNSPECIFIED SEASONALITY, UNSPECIFIED TRIGGER: ICD-10-CM

## 2023-01-30 DIAGNOSIS — Z23 NEED FOR INFLUENZA VACCINATION: ICD-10-CM

## 2023-01-30 DIAGNOSIS — G43.009 MIGRAINE WITHOUT AURA AND WITHOUT STATUS MIGRAINOSUS, NOT INTRACTABLE: ICD-10-CM

## 2023-01-30 DIAGNOSIS — F51.01 PRIMARY INSOMNIA: ICD-10-CM

## 2023-01-30 PROCEDURE — 90471 IMMUNIZATION ADMIN: CPT | Performed by: INTERNAL MEDICINE

## 2023-01-30 PROCEDURE — 99396 PREV VISIT EST AGE 40-64: CPT | Performed by: INTERNAL MEDICINE

## 2023-01-30 PROCEDURE — 0124A PR ADM SARSCOV2 30MCG/0.3ML BST: CPT | Performed by: INTERNAL MEDICINE

## 2023-01-30 PROCEDURE — 72040 X-RAY EXAM NECK SPINE 2-3 VW: CPT

## 2023-01-30 PROCEDURE — 73030 X-RAY EXAM OF SHOULDER: CPT

## 2023-01-30 PROCEDURE — 91312 COVID-19 (PFIZER) BIVALENT BOOSTER 12+YRS: CPT | Performed by: INTERNAL MEDICINE

## 2023-01-30 PROCEDURE — 90686 IIV4 VACC NO PRSV 0.5 ML IM: CPT | Performed by: INTERNAL MEDICINE

## 2023-01-30 RX ORDER — MONTELUKAST SODIUM 10 MG/1
10 TABLET ORAL
Qty: 90 TABLET | Refills: 3 | Status: SHIPPED | OUTPATIENT
Start: 2023-01-30

## 2023-01-30 RX ORDER — MOMETASONE FUROATE 50 UG/1
2 SPRAY, METERED NASAL DAILY
Qty: 1 EACH | Refills: 11 | Status: SHIPPED | OUTPATIENT
Start: 2023-01-30

## 2023-01-30 RX ORDER — RIZATRIPTAN BENZOATE 10 MG/1
10 TABLET ORAL ONCE AS NEEDED
Qty: 12 TABLET | Refills: 3 | Status: SHIPPED | OUTPATIENT
Start: 2023-01-30 | End: 2023-03-01

## 2023-01-30 RX ORDER — OMEPRAZOLE AND SODIUM BICARBONATE 40; 1100 MG/1; MG/1
1 CAPSULE ORAL
Qty: 90 CAPSULE | Refills: 3 | Status: SHIPPED | OUTPATIENT
Start: 2023-01-30

## 2023-01-30 RX ORDER — RIZATRIPTAN BENZOATE 10 MG/1
10 TABLET ORAL ONCE
Qty: 1 TABLET | Refills: 0 | Status: CANCELLED | OUTPATIENT
Start: 2023-01-30 | End: 2023-01-30

## 2023-01-30 RX ORDER — ALBUTEROL SULFATE 90 UG/1
2 AEROSOL, METERED RESPIRATORY (INHALATION) EVERY 4 HOURS PRN
Qty: 18 G | Refills: 11 | Status: SHIPPED | OUTPATIENT
Start: 2023-01-30

## 2023-01-30 RX ORDER — TERBINAFINE HYDROCHLORIDE 250 MG/1
250 TABLET ORAL
COMMUNITY
Start: 2023-01-16

## 2023-01-30 NOTE — PROGRESS NOTES
Male Physical Note      Date: 2023   Patient Name: La Hernandez  : 1965   MRN: [id number]     Chief Complaint:    Chief Complaint   Patient presents with   • Annual Exam     physical   • Allergies     Med refills   • Insomnia   • Heartburn   • Migraine       History of Present Illness: La Hernandez is a 57 y.o. female who is here today for their annual health maintenance and physical.  ***      Subjective      Review of Systems:   Review of Systems    Past Medical History, Social History, Family History and Care Team were all reviewed with patient and updated as appropriate.     Medications:     Current Outpatient Medications:   •  albuterol sulfate  (90 Base) MCG/ACT inhaler, Inhale 2 puffs Every 4 (Four) Hours As Needed for Wheezing., Disp: 18 g, Rfl: 11  •  desloratadine (CLARINEX) 5 MG tablet, TAKE ONE TABLET BY MOUTH DAILY, Disp: 90 tablet, Rfl: 3  •  estradiol (VIVELLE-DOT) 0.0375 MG/24HR patch, 1 patch 2 (Two) Times a Week., Disp: , Rfl:   •  guaiFENesin (MUCINEX) 600 MG 12 hr tablet, Take 1,200 mg by mouth 2 (Two) Times a Day., Disp: , Rfl:   •  Levonorgestrel 13.5 MG intrauterine device IUD, 1 each by Intrauterine route 1 (One) Time., Disp: , Rfl:   •  metaxalone (SKELAXIN) 800 MG tablet, 1 qd prn, Disp: 90 tablet, Rfl: 3  •  mometasone (Nasonex) 50 MCG/ACT nasal spray, 2 sprays into the nostril(s) as directed by provider Daily., Disp: 1 each, Rfl: 11  •  montelukast (SINGULAIR) 10 MG tablet, Take 1 tablet by mouth every night at bedtime., Disp: 90 tablet, Rfl: 3  •  MULTIPLE VITAMIN PO, Take  by mouth., Disp: , Rfl:   •  mupirocin (BACTROBAN) 2 % ointment, Apply 1 application topically to the appropriate area as directed 3 (Three) Times a Day As Needed., Disp: , Rfl:   •  omeprazole-sodium bicarbonate (ZEGERID)  MG per capsule, Take 1 capsule by mouth Every Morning Before Breakfast., Disp: 90 capsule, Rfl: 3  •  pseudoephedrine (SUDAFED) 30 MG tablet, Take 60 mg  "by mouth Daily., Disp: , Rfl:   •  vitamin D3 125 MCG (5000 UT) capsule capsule, Take 1 tablet by mouth., Disp: , Rfl:   •  zolpidem (AMBIEN) 10 MG tablet, Take 1 tablet by mouth At Night As Needed for Sleep., Disp: 15 tablet, Rfl: 2  •  nystatin (MYCOSTATIN) 100,000 unit/mL suspension, Swish and spit 5 mL 4 (Four) Times a Day., Disp: 60 mL, Rfl: 0  •  rizatriptan (MAXALT) 10 MG tablet, Take 1 tablet by mouth 1 (One) Time for 1 dose. AT ONSET OF HEADACHE MAY REPEAT ONE TABLET IN 2 HOURS IF NEEDED, Disp: 12 tablet, Rfl: 3  •  terbinafine (lamiSIL) 250 MG tablet, 250 mg. Take 7 days a month., Disp: , Rfl:     Allergies:   Allergies   Allergen Reactions   • Aspirin Hives, Itching, Nausea Only, Rash, Shortness Of Breath and Swelling   • Bactrim [Sulfamethoxazole-Trimethoprim] Hives   • Glen Plus [Aspirin Buffered] Hives   • Biaxin Oral Suspension [Clarithromycin] Other (See Comments)     Burning mouth   • Cabbage Anaphylaxis, Hives, Itching, Rash, Shortness Of Breath and Swelling   • Hydrocodone-Ibuprofen Hives   • Other Anaphylaxis, Itching, Rash, Shortness Of Breath and Swelling     Tree nut allergy   • Valium [Diazepam] Hives   • Wellbutrin [Bupropion] Hives   • Hydrocodone Unknown (See Comments)   • Morphine Other (See Comments)   • Penicillins Unknown (See Comments)   • Sulfabenzamide Other (See Comments)   • Penicillin V Potassium [Penicillin V] Rash           Colorectal Screening:     Last Completed Colonoscopy     This patient has no relevant Health Maintenance data.                PHQ-2 Depression Screening  PHQ-9 Total Score: 0          Objective     Physical Exam:  Vital Signs:   Vitals:    01/30/23 1533   BP: 142/92   BP Location: Right arm   Patient Position: Sitting   Pulse: 78   Temp: 96.9 °F (36.1 °C)   TempSrc: Infrared   SpO2: 99%   Weight: 75.3 kg (166 lb)   Height: 159 cm (62.61\")     Body mass index is 29.77 kg/m².   {BMI is >= 25 and <30. (Overweight) The following options were offered after " discussion;:[id number]}       Physical Exam    Procedures    Assessment / Plan      Assessment/Plan:   Diagnoses and all orders for this visit:    1. Need for COVID-19 vaccine (Primary)  -     COVID-19 Bivalent Booster (Pfizer) 12+yrs    2. Allergic rhinitis, unspecified seasonality, unspecified trigger    3. Gastroesophageal reflux disease without esophagitis    4. Migraine without aura and without status migrainosus, not intractable    5. Need for influenza vaccination  -     FluLaval/Fluzone >6 mos (3680-3409)         Follow Up:   No follow-ups on file.    Healthcare Maintenance:   Counseling provided on ***  La Hernandez voices understanding and acceptance of this advice and will call back with any further questions or concerns. AVS with preventive healthcare tips printed for patient.     I have spent a total of *** min on reviewing test results/preparing to see patient, counseling patient, performing medically appropriate exam and documenting clinical information in the electronic or other health record      Danni Cottrell MD. [facility]

## 2023-01-31 ENCOUNTER — TELEPHONE (OUTPATIENT)
Dept: INTERNAL MEDICINE | Facility: CLINIC | Age: 58
End: 2023-01-31
Payer: COMMERCIAL

## 2023-01-31 ENCOUNTER — TELEPHONE (OUTPATIENT)
Dept: INTERNAL MEDICINE | Facility: CLINIC | Age: 58
End: 2023-01-31

## 2023-01-31 DIAGNOSIS — M50.30 DDD (DEGENERATIVE DISC DISEASE), CERVICAL: Primary | ICD-10-CM

## 2023-01-31 NOTE — TELEPHONE ENCOUNTER
Attempted to contact the patient at [phone], VM box is full, unable to LVM.     HUB TO READ: Please provide the patient with the below provider comments.     ----- Message from Danni Cottrell MD sent at 1/31/2023  5:28 AM EST -----  Please call Ms. Hernandez and let her know her Xrays show degenerative disc disease in her cervical spine (neck) and mild degenerative changes in shoulder. I recommend she see an orthopedic doctor again. Please let me know if she is agreeable to seeing ortho and I will place the referral. Thank you

## 2023-01-31 NOTE — PROGRESS NOTES
Male Physical Note      Date: 2023   Patient Name: La Hernandez  : 1965   MRN: [id number]     Chief Complaint:    Chief Complaint   Patient presents with   • Annual Exam     physical   • Allergies     Med refills   • Insomnia   • Heartburn   • Migraine       History of Present Illness: La Hernandez is a 57 y.o. female who is here today for their annual health maintenance and physical.     Patient complains of left shoulder and left arm pain, that began about 5 weeks ago, radiates into the back, and was initially suspected to be originating from her neck.   Right hand dominant with left handed use of a mouse.   Range of motion was significantly decreased and a massage therapist expressed concern for frozen shoulder due to lack of movement. Some improvement noted from massage therapy.   Patient has also seen an osteopath, chiropractor, and undergone acupuncture. Osteopath does not feel she has a rotator cuff tear per her report.  Orthopedic Surgery was seen more than 5 years ago and she was advised surgical intervention was not necessary at that time.   Confirms constant neck pain and having undergone an x-ray of the cervical spine via chiropractic care.  Per the patient, x-ray of the cervical spine revealed cervical degenerative disc disease, an osteophyte measuring half an inch, a pinched nerve, and a bulging disc.   Denies any recent x-rays of the left shoulder.    Ms. Hernandez also reports being thrown from a horse at the age of 20 resulting in a spinal fracture and anterolisthesis.    She also complains of worsening allergies despite taking Singulair and Clarinex every morning as well as using Nasonex and doing a nasal rinse.  Patient has also been taking Benadryl as needed for itchy and swollen eyes, itchy throat, and itchy ears.  Confirms having seen an allergist in the past who completed an x-ray of the sinus and recommended sinus surgery. She denies receiving the results of her  imaging and decline sinus surgery.   Declines allergy shots and recalls having taken them twice in the past but states she was never able to get to a maintenance dose.     Currently taking Zegerid as needed and denies any complaints or concerns.     Also taking Maxalt as needed for migraines due to neck pain, which states is very effective usually after just 1 dose. She expresses understanding that she can only take 2 in a 24-hour period.     Mupirocin is prescribed for sciatic nerve pain that occasionally results in a significantly itchy rash of the left leg with 2 areas of scarring  She confirms having the rash seen by Dermatology who ruled out shingles due to lack of pain with touch.  Three small itchy lesions of the scalp are also reported, which she attributes to her neck pain.  Her dermatologist at [facility] has since retired and referral to a new dermatologist is requested at this time.   Maternal and paternal family history of skin cancer reported.     Patient undergoes Pap-smears with her OBGYN at [facility]. Last Pap-smear competed 3 years ago with TANI Kaur.  Appointment with TANI Kaur, scheduled for 03/02/2023.  Last mammogram completed 2 years ago and patient agrees to call and schedule for the near future.   Due for a colon cancer screening and she is amenable to doing the Cologuard at home colon cancer screening.  Denies any family history of colon cancer. Reports a maternal family history of colon polyps.   Cannot recall if she has ever been screened for hepatitis C.  She reports her last eye exam was in 02/2021 and she plans to schedule an appointment soon.  Confirms regular dental exams every 6 months. Next dental exam scheduled in 02/2023.  Up to date on the influenza vaccine, COVID-19 bivalent booster vaccine, and the Shingrix series.    Controlled substance agreement in place for the use of Ambien, which she only uses sparingly and has not refilled  since 01/2022.  She reports working a full-time job, part-time job, and completing a thesis, which causes occasional insomnia.   Of note, melatonin causes nightmares for her.     Patient is employed with the [employer].  Denies tobacco use.   Confirms a history of COVID-19 in 10/2022 that has resulted in ongoing but improving brain fog.    Subjective      Review of Systems:   Review of Systems   Gastrointestinal: Positive for GERD.   Musculoskeletal: Positive for back pain and neck pain.   Allergic/Immunologic: Positive for environmental allergies.   Neurological: Positive for headache.       Past Medical History, Social History, Family History and Care Team were all reviewed with patient and updated as appropriate.     Medications:     Current Outpatient Medications:   •  albuterol sulfate  (90 Base) MCG/ACT inhaler, Inhale 2 puffs Every 4 (Four) Hours As Needed for Wheezing., Disp: 18 g, Rfl: 11  •  desloratadine (CLARINEX) 5 MG tablet, TAKE ONE TABLET BY MOUTH DAILY, Disp: 90 tablet, Rfl: 3  •  estradiol (VIVELLE-DOT) 0.0375 MG/24HR patch, 1 patch 2 (Two) Times a Week., Disp: , Rfl:   •  guaiFENesin (MUCINEX) 600 MG 12 hr tablet, Take 1,200 mg by mouth 2 (Two) Times a Day., Disp: , Rfl:   •  Levonorgestrel 13.5 MG intrauterine device IUD, 1 each by Intrauterine route 1 (One) Time., Disp: , Rfl:   •  metaxalone (SKELAXIN) 800 MG tablet, 1 qd prn, Disp: 90 tablet, Rfl: 3  •  mometasone (Nasonex) 50 MCG/ACT nasal spray, 2 sprays into the nostril(s) as directed by provider Daily., Disp: 1 each, Rfl: 11  •  montelukast (SINGULAIR) 10 MG tablet, Take 1 tablet by mouth every night at bedtime., Disp: 90 tablet, Rfl: 3  •  MULTIPLE VITAMIN PO, Take  by mouth., Disp: , Rfl:   •  mupirocin (BACTROBAN) 2 % ointment, Apply 1 application topically to the appropriate area as directed 3 (Three) Times a Day As Needed (sore)., Disp: 22 g, Rfl: 5  •  omeprazole-sodium bicarbonate (ZEGERID)  MG per  capsule, Take 1 capsule by mouth Every Morning Before Breakfast., Disp: 90 capsule, Rfl: 3  •  pseudoephedrine (SUDAFED) 30 MG tablet, Take 60 mg by mouth Daily., Disp: , Rfl:   •  vitamin D3 125 MCG (5000 UT) capsule capsule, Take 1 tablet by mouth., Disp: , Rfl:   •  zolpidem (AMBIEN) 10 MG tablet, Take 1 tablet by mouth At Night As Needed for Sleep., Disp: 15 tablet, Rfl: 2  •  nystatin (MYCOSTATIN) 100,000 unit/mL suspension, Swish and spit 5 mL 4 (Four) Times a Day., Disp: 60 mL, Rfl: 0  •  rizatriptan (MAXALT) 10 MG tablet, Take 1 tablet by mouth 1 (One) Time As Needed for Migraine for up to 30 days. May repeat in 2 hours if needed; do not exceed 2 doses in 24hours, Disp: 12 tablet, Rfl: 3  •  terbinafine (lamiSIL) 250 MG tablet, 250 mg. Take 7 days a month., Disp: , Rfl:     Allergies:   Allergies   Allergen Reactions   • Aspirin Hives, Itching, Nausea Only, Rash, Shortness Of Breath and Swelling   • Bactrim [Sulfamethoxazole-Trimethoprim] Hives   • Glen Plus [Aspirin Buffered] Hives   • Biaxin Oral Suspension [Clarithromycin] Other (See Comments)     Burning mouth   • Cabbage Anaphylaxis, Hives, Itching, Rash, Shortness Of Breath and Swelling   • Hydrocodone-Ibuprofen Hives   • Other Anaphylaxis, Itching, Rash, Shortness Of Breath and Swelling     Tree nut allergy   • Valium [Diazepam] Hives   • Wellbutrin [Bupropion] Hives   • Hydrocodone Unknown (See Comments)   • Morphine Other (See Comments)   • Penicillins Unknown (See Comments)   • Sulfabenzamide Other (See Comments)   • Penicillin V Potassium [Penicillin V] Rash          Last Completed Colonoscopy     This patient has no relevant Health Maintenance data.          PHQ-2 Depression Screening  PHQ-9 Total Score: 0        Objective     Physical Exam:  Vital Signs:   Vitals:    01/30/23 1533 01/30/23 1637   BP: 142/92 122/80   BP Location: Right arm    Patient Position: Sitting    Pulse: 78    Temp: 96.9 °F (36.1 °C)    TempSrc: Infrared    SpO2: 99%   "  Weight: 75.3 kg (166 lb)    Height: 159 cm (62.61\")      Body mass index is 29.77 kg/m².   BMI is >= 25 and <30. (Overweight) The following options were offered after discussion;: weight loss educational material (shared in after visit summary)       Physical Exam  Vitals and nursing note reviewed.   Constitutional:       General: She is not in acute distress.     Appearance: Normal appearance. She is well-developed and well-groomed. She is not ill-appearing or toxic-appearing.   HENT:      Head: Normocephalic and atraumatic.      Right Ear: Tympanic membrane, ear canal and external ear normal.      Left Ear: Tympanic membrane, ear canal and external ear normal.      Nose: Nose normal.      Mouth/Throat:      Mouth: Mucous membranes are moist.      Pharynx: Oropharynx is clear.   Eyes:      Conjunctiva/sclera: Conjunctivae normal.      Pupils: Pupils are equal, round, and reactive to light.   Cardiovascular:      Rate and Rhythm: Normal rate and regular rhythm.      Heart sounds: Normal heart sounds. No murmur heard.    No friction rub. No gallop.   Pulmonary:      Effort: Pulmonary effort is normal. No respiratory distress.      Breath sounds: Normal breath sounds. No wheezing, rhonchi or rales.   Abdominal:      General: Bowel sounds are normal. There is no distension.      Palpations: Abdomen is soft.      Tenderness: There is no abdominal tenderness. There is no guarding.      Hernia: No hernia is present.   Musculoskeletal:         General: Normal range of motion.      Right shoulder: Normal range of motion.      Left shoulder: Normal range of motion.      Cervical back: Normal range of motion and neck supple. No rigidity. Normal range of motion.      Comments: FROM neck, FROM b/l shoulders   Lymphadenopathy:      Cervical: No cervical adenopathy.   Skin:     General: Skin is warm and dry.   Neurological:      General: No focal deficit present.      Mental Status: She is alert and oriented to person, place, " and time. Mental status is at baseline.      Sensory: Sensation is intact.      Motor: No weakness.      Gait: Gait normal.      Deep Tendon Reflexes: Reflexes are normal and symmetric.      Comments: Strength: 5/5 bilateral upper extremities and bilateral lower extremities.  : 5/5  Normal gait  No AD   Psychiatric:         Attention and Perception: Attention normal.         Mood and Affect: Mood normal.         Behavior: Behavior normal. Behavior is cooperative.         Thought Content: Thought content normal.         Judgment: Judgment normal.         Procedures    Assessment / Plan      Assessment/Plan:   Diagnoses and all orders for this visit:    1. Healthcare maintenance (Primary)  -     TSH Rfx On Abnormal To Free T4; Future  -     Comprehensive Metabolic Panel; Future  -     CBC (No Diff); Future  -     Hemoglobin A1c; Future  -     Lipid Panel; Future  -     Hepatitis C Antibody; Future    2. Need for COVID-19 vaccine  -     COVID-19 Bivalent Booster (Pfizer) 12+yrs    3. Need for influenza vaccination  -     FluLaval/Fluzone >6 mos ([id number])    4. Colon cancer screening  -     Cologuard - Stool, Per Rectum; Future    5. Left shoulder pain, unspecified chronicity  -     XR Shoulder 2+ View Left; Future    6. Neck pain  -     XR Spine Cervical 2 or 3 View; Future    7. Allergic rhinitis, unspecified seasonality, unspecified trigger  -     mometasone (Nasonex) 50 MCG/ACT nasal spray; 2 sprays into the nostril(s) as directed by provider Daily.  Dispense: 1 each; Refill: 11  -     montelukast (SINGULAIR) 10 MG tablet; Take 1 tablet by mouth every night at bedtime.  Dispense: 90 tablet; Refill: 3    8. Gastroesophageal reflux disease without esophagitis  -     omeprazole-sodium bicarbonate (ZEGERID)  MG per capsule; Take 1 capsule by mouth Every Morning Before Breakfast.  Dispense: 90 capsule; Refill: 3  -     Vitamin B12; Future  -     Magnesium; Future    9. Migraine without aura and without  status migrainosus, not intractable  -     rizatriptan (MAXALT) 10 MG tablet; Take 1 tablet by mouth 1 (One) Time As Needed for Migraine for up to 30 days. May repeat in 2 hours if needed; do not exceed 2 doses in 24hours  Dispense: 12 tablet; Refill: 3    10. Primary insomnia    11. Encounter for monitoring of patient compliance in drug treatment program  -     Compliance Drug Analysis, Ur - Urine, Clean Catch; Future  -     Compliance Drug Analysis, Ur - Urine, Clean Catch    Other orders  -     albuterol sulfate  (90 Base) MCG/ACT inhaler; Inhale 2 puffs Every 4 (Four) Hours As Needed for Wheezing.  Dispense: 18 g; Refill: 11  -     mupirocin (BACTROBAN) 2 % ointment; Apply 1 application topically to the appropriate area as directed 3 (Three) Times a Day As Needed (sore).  Dispense: 22 g; Refill: 5    1. Preventative healthcare maintenance  - Patient follows with GYN for Pap-smears and is scheduled to see GYN on 03/22/2023.   - She states she is due for a Pap-smear this year.  - She agrees to call and schedule her mammogram.  - Agreeable to Cologuard testing for colon cancer screening and the referral has been placed today.  - Check screening labs: CMP, CBC, FLP, hemoglobin A1c, hepatitis C, and TSH.  -  Patient is up to date on all vaccines.    2. Left shoulder and neck pain   - Patient states she has seen a chiropractor, a massage therapist, and acupuncture without any relief.   - Neuromuscular exam today was normal.   - Counseled will further evaluate with an x-ray of the left shoulder and cervical spine.   - Counseled pending these results,  will determine next steps.    3. Allergies  - Patient states her allergies have worsened and she has continued on the same medications.  - Referral to an allergist was offered and declined at this time.    4. Chronic GERD  - Patient takes daily omeprazole.   - Check magnesium and vitamin B12.      5. Migraines  - Infrequent but uses Maxalt as needed.  - She is  aware not to exceed 2 doses Maxalt in a 24 hour period of time.      6. Insomnia  - Patient takes Ambien very infrequently.   - Updated controlled substance contract today and obtained UDS today.   - Refill on Ambien not needed at this time.   - Jeff appropriate    7. Elevated blood pressure  - Repeat blood pressure was normal.     Follow Up:   1 year Dr. Mooney    Healthcare Maintenance:   Counseling provided on cancer screening (cervical, breast, colon, skin), immunizations, screening labs, diet/exercise and other information provided above.  La VLADISLVA Hernandez voices understanding and acceptance of this advice and will call back with any further questions or concerns. AVS with preventive healthcare tips printed for patient.     I have spent a total of 45  min on reviewing test results/preparing to see patient, counseling patient, performing medically appropriate exam and documenting clinical information in the electronic or other health record      Danni Cottrell MD. [facility]   Transcribed from ambient dictation for Danni Cottrell MD by Ana Novoa.  01/30/23   19:40 EST    Patient or patient representative verbalized consent to the visit recording.   I have personally performed the services described in this document as transcribed by the above individual, and it is both accurate and complete.

## 2023-01-31 NOTE — TELEPHONE ENCOUNTER
TREV CALLED IN ABOUT HER MISSED CALL. READ PREVIOUS MESSAGE. SHE UNDERSTANDS AND WOULD LIKE THE REFERRAL TO ORTHO.    PHONE: [phone]

## 2023-01-31 NOTE — PROGRESS NOTES
Please call Ms. Hernandez and let her know her Xrays show degenerative disc disease in her cervical spine (neck) and mild degenerative changes in shoulder. I recommend she see an orthopedic doctor again. Please let me know if she is agreeable to seeing ortho and I will place the referral. Thank you

## 2023-01-31 NOTE — TELEPHONE ENCOUNTER
TREV CALLED IN ABOUT HER MISSED CALL. READ PREVIOUS MESSAGE. SHE UNDERSTANDS AND WOULD LIKE THE REFERRAL TO ORTHO.     PHONE: [phone]

## 2023-02-06 LAB — DRUGS UR: NORMAL

## 2023-03-15 ENCOUNTER — TELEPHONE (OUTPATIENT)
Dept: INTERNAL MEDICINE | Facility: CLINIC | Age: 58
End: 2023-03-15
Payer: COMMERCIAL

## 2023-03-15 NOTE — TELEPHONE ENCOUNTER
Patient called and stated that she was at the lab and they had no record of her lab orders from 01/30/23.  Patient was not at a  lab.  Faxed Lab Orders to [facility] [phone] confirmed.

## 2023-04-16 DIAGNOSIS — J30.9 ALLERGIC RHINITIS, UNSPECIFIED SEASONALITY, UNSPECIFIED TRIGGER: ICD-10-CM

## 2023-04-17 RX ORDER — DESLORATADINE 5 MG/1
TABLET ORAL
Qty: 90 TABLET | Refills: 3 | Status: SHIPPED | OUTPATIENT
Start: 2023-04-17

## 2023-05-03 DIAGNOSIS — M54.2 NECK PAIN: ICD-10-CM

## 2023-05-04 RX ORDER — METAXALONE 800 MG/1
TABLET ORAL
Qty: 90 TABLET | Refills: 0 | Status: SHIPPED | OUTPATIENT
Start: 2023-05-04

## 2023-08-14 DIAGNOSIS — M54.2 NECK PAIN: ICD-10-CM

## 2023-08-14 NOTE — TELEPHONE ENCOUNTER
Caller: La Hernandez VLADISLAV    Relationship: Self    Best call back number:[phone]     Requested Prescriptions:   Requested Prescriptions     Pending Prescriptions Disp Refills    metaxalone (SKELAXIN) 800 MG tablet 90 tablet 0     Sig: TAKE ONE TABLET BY MOUTH DAILY AS NEEDED        Pharmacy where request should be sent: [facility] [id number] [address] AT [facility] [address] - [phone]  - [phone] FX     Last office visit with prescribing clinician: 1/26/2022   Last telemedicine visit with prescribing clinician: Visit date not found   Next office visit with prescribing clinician: 2/2/2024     Does the patient have less than a 3 day supply:  [x] Yes  [] No    Would you like a call back once the refill request has been completed: [x] Yes [] No    If the office needs to give you a call back, can they leave a voicemail: [x] Yes [] No    Marc Capps Rep   08/14/23 10:42 EDT

## 2023-08-15 RX ORDER — METAXALONE 800 MG/1
TABLET ORAL
Qty: 90 TABLET | Refills: 0 | Status: SHIPPED | OUTPATIENT
Start: 2023-08-15

## 2023-11-15 DIAGNOSIS — M54.2 NECK PAIN: ICD-10-CM

## 2023-11-16 RX ORDER — METAXALONE 800 MG/1
TABLET ORAL
Qty: 90 TABLET | Refills: 0 | OUTPATIENT
Start: 2023-11-16

## 2023-11-17 ENCOUNTER — TELEPHONE (OUTPATIENT)
Dept: INTERNAL MEDICINE | Facility: CLINIC | Age: 58
End: 2023-11-17
Payer: COMMERCIAL

## 2023-11-17 DIAGNOSIS — M54.2 NECK PAIN: ICD-10-CM

## 2023-11-17 RX ORDER — METAXALONE 800 MG/1
TABLET ORAL
Qty: 90 TABLET | Refills: 0 | Status: SHIPPED | OUTPATIENT
Start: 2023-11-17

## 2023-11-17 NOTE — TELEPHONE ENCOUNTER
Caller: La Hernandez    Relationship: Self    Best call back number: [phone]     Which medication are you concerned about: metaxalone (SKELAXIN) 800 MG tablet     What are your concerns: PATIENT STATES SHE HAS TRIED TO GET THIS REFILLED BUT THE REFILL WAS DENIED BECAUSE IT WAS NOT APPROPRIATE AND SHE WOULD LIKE A CALL BACK TO DISCUSS WHY SHE CANNOT GET THIS FILLED. SHE IS OUT OF THIS MEDICATION.

## 2023-11-17 NOTE — TELEPHONE ENCOUNTER
Patient is taking this script every night at bedtime. Script sent to the pharmacy for 90/0 refills.

## 2024-01-12 DIAGNOSIS — G43.009 MIGRAINE WITHOUT AURA AND WITHOUT STATUS MIGRAINOSUS, NOT INTRACTABLE: ICD-10-CM

## 2024-01-12 RX ORDER — RIZATRIPTAN BENZOATE 10 MG/1
10 TABLET ORAL ONCE AS NEEDED
Qty: 12 TABLET | Refills: 0 | Status: SHIPPED | OUTPATIENT
Start: 2024-01-12 | End: 2024-02-11

## 2024-01-12 NOTE — TELEPHONE ENCOUNTER
Caller: La Hernandez VLADISLAV    Relationship: Self    Best call back number: [phone]     Requested Prescriptions:   Requested Prescriptions     Pending Prescriptions Disp Refills    rizatriptan (MAXALT) 10 MG tablet 12 tablet 3     Sig: Take 1 tablet by mouth 1 (One) Time As Needed for Migraine for up to 30 days. May repeat in 2 hours if needed; do not exceed 2 doses in 24hours      Pharmacy where request should be sent: [facility] [id number] [place], [place] - [address] [facility] AT [facility] [address] - [phone] [facility] [phone] FX     Last office visit with prescribing clinician: 1/26/2022   Last telemedicine visit with prescribing clinician: Visit date not found   Next office visit with prescribing clinician: 2/2/2024     Additional details provided by patient: PATIENT IS OUT OF THIS MEDICATION    Does the patient have less than a 3 day supply:  [x] Yes  [] No    Would you like a call back once the refill request has been completed: [] Yes [x] No    If the office needs to give you a call back, can they leave a voicemail: [] Yes [x] No    Marc Mar Rep   01/12/24 11:26 EST

## 2024-01-30 NOTE — PROGRESS NOTES
Here for physical    Exercise: none currently because she feels so tired  Diet -tries to eat healthy but sometimes is too tired to cook.  On vit D , Bcomplex, MVI (sometimes forget to take)     Gerd- on zegerid as needed, a couple times a weeks.  This works well    Insomnia - uses ambien occasionally and helps her sleep.  Usually will take a week before she goes back to work.  She does need a refill. No side effects with it. Gets about 6 hrs a night.      Covid in 10/22 and 10/23.has had brain fog since the episode in '22 - trouble w/ memory since she had COVID     Mouth and eyes are very dry-she does use an over-the-counter rewetting drops for the eyes.  Been on fish oil in the past but not currently    Current Outpatient Medications:     albuterol sulfate  (90 Base) MCG/ACT inhaler, Inhale 2 puffs Every 4 (Four) Hours As Needed for Wheezing., Disp: 18 g, Rfl: 11    desloratadine (CLARINEX) 5 MG tablet, TAKE ONE TABLET BY MOUTH DAILY, Disp: 90 tablet, Rfl: 3    estradiol (VIVELLE-DOT) 0.0375 MG/24HR patch, 1 patch 2 (Two) Times a Week., Disp: , Rfl:     guaiFENesin (MUCINEX) 600 MG 12 hr tablet, Take 2 tablets by mouth 2 (Two) Times a Day., Disp: , Rfl:     Levonorgestrel 13.5 MG intrauterine device IUD, 1 each by Intrauterine route 1 (One) Time., Disp: , Rfl:     metaxalone (SKELAXIN) 800 MG tablet, Take 1 tablet by mouth Daily. TAKE ONE TABLET BY MOUTH DAILY AS NEEDED, Disp: 90 tablet, Rfl: 3    mometasone (Nasonex) 50 MCG/ACT nasal spray, 2 sprays into the nostril(s) as directed by provider Daily., Disp: 1 each, Rfl: 11    montelukast (SINGULAIR) 10 MG tablet, Take 1 tablet by mouth every night at bedtime., Disp: 90 tablet, Rfl: 3    MULTIPLE VITAMIN PO, Take  by mouth., Disp: , Rfl:     mupirocin (BACTROBAN) 2 % ointment, Apply 1 application topically to the appropriate area as directed 3 (Three) Times a Day As Needed (sore)., Disp: 22 g, Rfl: 5    omeprazole-sodium bicarbonate (ZEGERID)  MG per  "capsule, As needed for GERD, Disp: 90 capsule, Rfl: 3    pseudoephedrine (SUDAFED) 30 MG tablet, Take 2 tablets by mouth Daily., Disp: , Rfl:     rizatriptan (MAXALT) 10 MG tablet, Take 1 tablet by mouth 1 (One) Time As Needed for Migraine for up to 30 days. May repeat in 2 hours if needed; do not exceed 2 doses in 24hours, Disp: 12 tablet, Rfl: 0    vitamin D3 125 MCG (5000 UT) capsule capsule, Take 1 tablet by mouth., Disp: , Rfl:     zolpidem (AMBIEN) 10 MG tablet, Take 1 tablet by mouth At Night As Needed for Sleep., Disp: 15 tablet, Rfl: 2    terbinafine (lamiSIL) 250 MG tablet, 250 mg. Take 7 days a month. (Patient not taking: Reported on 2/2/2024), Disp: , Rfl:     The following portions of the patient's history were reviewed and updated as appropriate: allergies, current medications, past family history, past medical history, past social history, past surgical history and problem list.    Review of Systems   Constitutional:  Positive for fatigue. Negative for activity change, appetite change, fever, unexpected weight gain and unexpected weight loss.   HENT: Negative.     Eyes: Negative.    Respiratory:  Negative for shortness of breath and wheezing.    Cardiovascular:  Negative for chest pain, palpitations and leg swelling.   Gastrointestinal: Negative.  Positive for GERD.   Endocrine: Negative.    Genitourinary:  Negative for difficulty urinating and dysuria.   Musculoskeletal:  Positive for arthralgias.   Skin: Negative.    Allergic/Immunologic: Negative for immunocompromised state.   Neurological:  Negative for seizures, speech difficulty, memory problem and confusion.   Hematological:  Does not bruise/bleed easily.   Psychiatric/Behavioral:  Positive for sleep disturbance (gets about 6 hrs a night) and stress. Negative for agitation.          Objective    /76 (BP Location: Right arm, Patient Position: Sitting)   Pulse 88   Temp 97.1 °F (36.2 °C) (Infrared)   Ht 159 cm (62.6\")   Wt 76.7 kg (169 " lb)   SpO2 98%   BMI 30.32 kg/m²   Physical Exam   Physical Exam  Vitals and nursing note reviewed.   Constitutional:       General: She is not in acute distress.     Appearance: She is well-developed. She is not diaphoretic.   HENT:      Head: Normocephalic and atraumatic.      Right Ear: External ear normal.      Left Ear: External ear normal.      Nose: Nose normal.      Mouth/Throat:      Pharynx: No oropharyngeal exudate.   Eyes:      General: No scleral icterus.        Right eye: No discharge.         Left eye: No discharge.      Conjunctiva/sclera: Conjunctivae normal.      Pupils: Pupils are equal, round, and reactive to light.   Neck:      Thyroid: No thyromegaly.   Cardiovascular:      Rate and Rhythm: Normal rate and regular rhythm.      Heart sounds: Normal heart sounds. No murmur heard.     No friction rub. No gallop.   Pulmonary:      Effort: Pulmonary effort is normal. No respiratory distress.      Breath sounds: Normal breath sounds. No wheezing or rales.   Abdominal:      General: Bowel sounds are normal. There is no distension.      Palpations: Abdomen is soft. There is no mass.      Tenderness: There is no abdominal tenderness. There is no guarding or rebound.   Musculoskeletal:         General: No deformity. Normal range of motion.      Cervical back: Normal range of motion and neck supple.   Lymphadenopathy:      Cervical: No cervical adenopathy.   Skin:     General: Skin is warm and dry.      Coloration: Skin is not pale.      Findings: No erythema or rash.   Neurological:      Mental Status: She is alert and oriented to person, place, and time.      Coordination: Coordination normal.      Deep Tendon Reflexes: Reflexes normal.   Psychiatric:         Behavior: Behavior normal.         Thought Content: Thought content normal.         Judgment: Judgment normal.           Assessment/Plan   Diagnoses and all orders for this visit:    1. Routine general medical examination at a [facility]  facility (Primary)  Regular exercise/healthy diet. BSE q month. Sunscreen use encouraged. Check fasting labs-order for labs given to patient and she will do through her work  Clover - she will schedule her clover at [facility]-Numbers given  Colon cancer screening due now - we had ordered cologuard previously, but she did not do. She does have a box at home and will check expiration date.  If it has , she will let me know and I can order a new 1  DT due in   Shingles- she had shingrix  Hep A - had  Sees GYN for paps (dr wilkes)  Flu vaccine -she had   covid-19 vaccine- today  RSV- discussed today  2. Allergic rhinitis, unspecified seasonality, unspecified trigger  -     montelukast (SINGULAIR) 10 MG tablet; Take 1 tablet by mouth every night at bedtime.  Dispense: 90 tablet; Refill: 3    3. Gastroesophageal reflux disease without esophagitis-uses zegerid as needed which works well.  Previous PPIs have not worked as well and she has had to take daily  -     omeprazole-sodium bicarbonate (ZEGERID)  MG per capsule; As needed for GERD  Dispense: 90 capsule; Refill: 3    4. Need for COVID-19 vaccine  -     COVID-19 F23 (Pfizer) 12yrs+ (COMIRNATY)    5. Primary insomnia-patient uses Ambien infrequently.  We did discuss some of the concerns regarding long-term Ambien use but she is not using nightly.  Sleep hygiene reviewed.  She has signed controlled substance contract and Jeff is appropriate  -     zolpidem (AMBIEN) 10 MG tablet; Take 1 tablet by mouth At Night As Needed for Sleep.  Dispense: 15 tablet; Refill: 2    6. Neck pain  -     metaxalone (SKELAXIN) 800 MG tablet; Take 1 tablet by mouth Daily. TAKE ONE TABLET BY MOUTH DAILY AS NEEDED  Dispense: 90 tablet; Refill: 3    7. Other fatigue-check vitamin D and B12.  Try to get plenty of sleep.  Try to improve diet and get some exercise    8. Sicca, unspecified type-we will check that SSA and SSB labs

## 2024-02-02 ENCOUNTER — OFFICE VISIT (OUTPATIENT)
Dept: INTERNAL MEDICINE | Facility: CLINIC | Age: 59
End: 2024-02-02
Payer: COMMERCIAL

## 2024-02-02 VITALS
WEIGHT: 169 LBS | DIASTOLIC BLOOD PRESSURE: 76 MMHG | OXYGEN SATURATION: 98 % | BODY MASS INDEX: 29.95 KG/M2 | SYSTOLIC BLOOD PRESSURE: 134 MMHG | TEMPERATURE: 97.1 F | HEIGHT: 63 IN | HEART RATE: 88 BPM

## 2024-02-02 DIAGNOSIS — R53.83 OTHER FATIGUE: ICD-10-CM

## 2024-02-02 DIAGNOSIS — M35.00 SICCA, UNSPECIFIED TYPE: ICD-10-CM

## 2024-02-02 DIAGNOSIS — J30.9 ALLERGIC RHINITIS, UNSPECIFIED SEASONALITY, UNSPECIFIED TRIGGER: ICD-10-CM

## 2024-02-02 DIAGNOSIS — M54.2 NECK PAIN: ICD-10-CM

## 2024-02-02 DIAGNOSIS — Z23 NEED FOR COVID-19 VACCINE: ICD-10-CM

## 2024-02-02 DIAGNOSIS — Z00.00 ROUTINE GENERAL MEDICAL EXAMINATION AT A HEALTH CARE FACILITY: Primary | ICD-10-CM

## 2024-02-02 DIAGNOSIS — K21.9 GASTROESOPHAGEAL REFLUX DISEASE WITHOUT ESOPHAGITIS: ICD-10-CM

## 2024-02-02 DIAGNOSIS — F51.01 PRIMARY INSOMNIA: ICD-10-CM

## 2024-02-02 RX ORDER — OMEPRAZOLE AND SODIUM BICARBONATE 40; 1100 MG/1; MG/1
CAPSULE ORAL
Qty: 90 CAPSULE | Refills: 3 | Status: SHIPPED | OUTPATIENT
Start: 2024-02-02

## 2024-02-02 RX ORDER — ZOLPIDEM TARTRATE 10 MG/1
10 TABLET ORAL NIGHTLY PRN
Qty: 15 TABLET | Refills: 2 | Status: SHIPPED | OUTPATIENT
Start: 2024-02-02

## 2024-02-02 RX ORDER — MONTELUKAST SODIUM 10 MG/1
10 TABLET ORAL
Qty: 90 TABLET | Refills: 3 | Status: SHIPPED | OUTPATIENT
Start: 2024-02-02

## 2024-02-02 RX ORDER — METAXALONE 800 MG/1
800 TABLET ORAL DAILY
Qty: 90 TABLET | Refills: 3 | Status: SHIPPED | OUTPATIENT
Start: 2024-02-02

## 2024-02-02 RX ORDER — ZOLPIDEM TARTRATE 10 MG/1
10 TABLET ORAL NIGHTLY PRN
Qty: 15 TABLET | Refills: 2 | Status: CANCELLED | OUTPATIENT
Start: 2024-02-02

## 2024-02-05 ENCOUNTER — PRIOR AUTHORIZATION (OUTPATIENT)
Dept: INTERNAL MEDICINE | Facility: CLINIC | Age: 59
End: 2024-02-05
Payer: COMMERCIAL

## 2024-02-14 DIAGNOSIS — K21.9 GASTROESOPHAGEAL REFLUX DISEASE WITHOUT ESOPHAGITIS: ICD-10-CM

## 2024-02-14 RX ORDER — OMEPRAZOLE AND SODIUM BICARBONATE 40; 1100 MG/1; MG/1
CAPSULE ORAL
Qty: 90 CAPSULE | Refills: 3 | Status: SHIPPED | OUTPATIENT
Start: 2024-02-14

## 2024-04-25 DIAGNOSIS — J30.9 ALLERGIC RHINITIS, UNSPECIFIED SEASONALITY, UNSPECIFIED TRIGGER: ICD-10-CM

## 2024-04-25 RX ORDER — DESLORATADINE 5 MG/1
TABLET ORAL
Qty: 90 TABLET | Refills: 3 | Status: SHIPPED | OUTPATIENT
Start: 2024-04-25

## 2024-04-25 NOTE — TELEPHONE ENCOUNTER
Rx Refill Note  Requested Prescriptions     Pending Prescriptions Disp Refills    desloratadine (CLARINEX) 5 MG tablet [Pharmacy Med Name: DESLORATADINE 5 MG TABLET] 90 tablet 3     Sig: TAKE ONE TABLET BY MOUTH DAILY      Last office visit with prescribing clinician: 2/2/2024     Next office visit with prescribing clinician: 2/7/2025       Joanna Bird  04/25/24, 09:06 EDT

## 2024-04-26 DIAGNOSIS — M54.2 NECK PAIN: ICD-10-CM

## 2024-04-26 DIAGNOSIS — J30.9 ALLERGIC RHINITIS, UNSPECIFIED SEASONALITY, UNSPECIFIED TRIGGER: ICD-10-CM

## 2024-04-26 DIAGNOSIS — R06.2 WHEEZING: Primary | ICD-10-CM

## 2024-04-26 DIAGNOSIS — G43.009 MIGRAINE WITHOUT AURA AND WITHOUT STATUS MIGRAINOSUS, NOT INTRACTABLE: ICD-10-CM

## 2024-04-26 RX ORDER — ALBUTEROL SULFATE 90 UG/1
2 AEROSOL, METERED RESPIRATORY (INHALATION) EVERY 4 HOURS PRN
Qty: 18 G | Refills: 11 | Status: SHIPPED | OUTPATIENT
Start: 2024-04-26

## 2024-04-26 RX ORDER — METAXALONE 800 MG/1
800 TABLET ORAL DAILY
Qty: 90 TABLET | Refills: 3 | OUTPATIENT
Start: 2024-04-26

## 2024-04-26 RX ORDER — DESLORATADINE 5 MG/1
5 TABLET ORAL DAILY
Qty: 90 TABLET | Refills: 3 | OUTPATIENT
Start: 2024-04-26

## 2024-04-26 RX ORDER — RIZATRIPTAN BENZOATE 10 MG/1
10 TABLET ORAL ONCE AS NEEDED
Qty: 12 TABLET | Refills: 1 | Status: SHIPPED | OUTPATIENT
Start: 2024-04-26 | End: 2024-05-26

## 2024-04-26 NOTE — TELEPHONE ENCOUNTER
Caller: La Hernandez    Relationship: Self    Best call back number: [phone]     Requested Prescriptions:   Requested Prescriptions     Pending Prescriptions Disp Refills    rizatriptan (MAXALT) 10 MG tablet [Pharmacy Med Name: RIZATRIPTAN 10 MG TABLET] 12 tablet 0     Sig: TAKE 1 TABLET BY MOUTH 1 (ONE) TIME AS NEEDED FOR MIGRAINE FOR UP TO 30 DAYS. MAY REPEAT IN 2 HOURS IF NEEDED; DO NOT EXCEED 2 DOSES IN 24HOURS    metaxalone (SKELAXIN) 800 MG tablet 90 tablet 3     Sig: Take 1 tablet by mouth Daily. TAKE ONE TABLET BY MOUTH DAILY AS NEEDED    albuterol sulfate  (90 Base) MCG/ACT inhaler 18 g 11     Sig: Inhale 2 puffs Every 4 (Four) Hours As Needed for Wheezing.    desloratadine (CLARINEX) 5 MG tablet 90 tablet 3     Sig: Take 1 tablet by mouth Daily.        Pharmacy where request should be sent: [facility] [id number] [address] AT [facility] [address] [facility] [phone] [facility] [phone] FX     Last office visit with prescribing clinician: 2/2/2024   Last telemedicine visit with prescribing clinician: Visit date not found   Next office visit with prescribing clinician: 2/7/2025     Additional details provided by patient: PATIENT STATES SHE HAS SPOKE WITH THE PHARMACY AND THEY HAVE CONFIRMED THEY DO NOT HAVE A REFILL REQUEST FOR ANY OF THE MEDICATIONS.     Does the patient have less than a 3 day supply:  [x] Yes  [] No    Would you like a call back once the refill request has been completed: [] Yes [x] No    If the office needs to give you a call back, can they leave a voicemail: [] Yes [x] No    Marc Kahn Rep   04/26/24 10:19 EDT

## 2024-07-31 DIAGNOSIS — G43.009 MIGRAINE WITHOUT AURA AND WITHOUT STATUS MIGRAINOSUS, NOT INTRACTABLE: ICD-10-CM

## 2024-07-31 RX ORDER — RIZATRIPTAN BENZOATE 10 MG/1
10 TABLET ORAL ONCE AS NEEDED
Qty: 12 TABLET | Refills: 1 | Status: SHIPPED | OUTPATIENT
Start: 2024-07-31 | End: 2024-08-30

## 2024-07-31 NOTE — TELEPHONE ENCOUNTER
Rx Refill Note  Requested Prescriptions     Pending Prescriptions Disp Refills    rizatriptan (MAXALT) 10 MG tablet [Pharmacy Med Name: RIZATRIPTAN 10 MG TABLET] 12 tablet 1     Sig: TAKE 1 TABLET BY MOUTH 1 (ONE) TIME AS NEEDED FOR MIGRAINE FOR UP TO 30 DAYS. MAY REPEAT IN 2 HOURS IF NEEDED; DO NOT EXCEED 2 DOSES IN 24HOURS      Last office visit with prescribing clinician: 2/2/2024   Last telemedicine visit with prescribing clinician: Visit date not found   Next office visit with prescribing clinician: 2/7/2025       Estrellita Huntley MA  07/31/24, 13:15 EDT

## 2024-10-21 DIAGNOSIS — F51.01 PRIMARY INSOMNIA: ICD-10-CM

## 2024-10-21 RX ORDER — ZOLPIDEM TARTRATE 10 MG/1
10 TABLET ORAL NIGHTLY PRN
Qty: 15 TABLET | OUTPATIENT
Start: 2024-10-21

## 2024-10-21 NOTE — TELEPHONE ENCOUNTER
Rx Refill Note  Requested Prescriptions     Pending Prescriptions Disp Refills    zolpidem (AMBIEN) 10 MG tablet [Pharmacy Med Name: ZOLPIDEM TARTRATE 10 MG TABLET] 15 tablet      Sig: TAKE 1 TABLET BY MOUTH EVERY NIGHT AT BEDTIME AS NEEDED FOR SLEEP      Last office visit with prescribing clinician: 2/2/2024   Last telemedicine visit with prescribing clinician: Visit date not found   Next office visit with prescribing clinician: 2/7/2025       Estrellita Huntley MA  10/21/24, 11:44 EDT

## 2024-12-26 DIAGNOSIS — F51.01 PRIMARY INSOMNIA: ICD-10-CM

## 2024-12-26 RX ORDER — ZOLPIDEM TARTRATE 10 MG/1
10 TABLET ORAL NIGHTLY PRN
Qty: 15 TABLET | Refills: 2 | OUTPATIENT
Start: 2024-12-26

## 2024-12-26 NOTE — TELEPHONE ENCOUNTER
Caller: La Hernandez    Relationship: Self    Best call back number:   Telephone Information:   Mobile [phone]        Requested Prescriptions:   Requested Prescriptions     Pending Prescriptions Disp Refills    zolpidem (AMBIEN) 10 MG tablet 15 tablet 2     Sig: Take 1 tablet by mouth At Night As Needed for Sleep.        Pharmacy where request should be sent: [facility] [id number] - [place], [place] - [address] [facility] AT [facility] [address] - [phone]  - [phone] FX     Last office visit with prescribing clinician: 2/2/2024   Last telemedicine visit with prescribing clinician: Visit date not found   Next office visit with prescribing clinician: 2/7/2025     Additional details provided by patient:     Does the patient have less than a 3 day supply:  [x] Yes  [] No    Would you like a call back once the refill request has been completed: [] Yes [x] No    If the office needs to give you a call back, can they leave a voicemail: [] Yes [x] No    Marc Mena Rep   12/26/24 14:42 EST

## 2024-12-26 NOTE — TELEPHONE ENCOUNTER
Rx Refill Note  Requested Prescriptions     Pending Prescriptions Disp Refills    zolpidem (AMBIEN) 10 MG tablet 15 tablet 2     Sig: Take 1 tablet by mouth At Night As Needed for Sleep.      Last office visit with prescribing clinician: 2/2/2024   Last telemedicine visit with prescribing clinician: Visit date not found   Next office visit with prescribing clinician: 2/7/2025       Estrellita Huntley MA  12/26/24, 14:46 EST

## 2025-01-31 DIAGNOSIS — M54.2 NECK PAIN: ICD-10-CM

## 2025-01-31 DIAGNOSIS — G43.009 MIGRAINE WITHOUT AURA AND WITHOUT STATUS MIGRAINOSUS, NOT INTRACTABLE: ICD-10-CM

## 2025-01-31 RX ORDER — METAXALONE 800 MG/1
800 TABLET ORAL DAILY PRN
Qty: 30 TABLET | Refills: 0 | Status: SHIPPED | OUTPATIENT
Start: 2025-01-31

## 2025-01-31 RX ORDER — RIZATRIPTAN BENZOATE 10 MG/1
TABLET ORAL
Qty: 12 TABLET | Refills: 0 | Status: SHIPPED | OUTPATIENT
Start: 2025-01-31

## 2025-02-07 ENCOUNTER — OFFICE VISIT (OUTPATIENT)
Dept: INTERNAL MEDICINE | Facility: CLINIC | Age: 60
End: 2025-02-07
Payer: COMMERCIAL

## 2025-02-07 VITALS
HEART RATE: 106 BPM | DIASTOLIC BLOOD PRESSURE: 82 MMHG | SYSTOLIC BLOOD PRESSURE: 136 MMHG | OXYGEN SATURATION: 97 % | BODY MASS INDEX: 28.81 KG/M2 | HEIGHT: 63 IN | WEIGHT: 162.6 LBS | TEMPERATURE: 97.5 F

## 2025-02-07 DIAGNOSIS — Z00.00 ROUTINE GENERAL MEDICAL EXAMINATION AT A HEALTH CARE FACILITY: Primary | ICD-10-CM

## 2025-02-07 DIAGNOSIS — Z12.31 ENCOUNTER FOR SCREENING MAMMOGRAM FOR MALIGNANT NEOPLASM OF BREAST: ICD-10-CM

## 2025-02-07 DIAGNOSIS — Z12.11 COLON CANCER SCREENING: ICD-10-CM

## 2025-02-07 DIAGNOSIS — F51.01 PRIMARY INSOMNIA: ICD-10-CM

## 2025-02-07 DIAGNOSIS — Z13.820 SCREENING FOR OSTEOPOROSIS: ICD-10-CM

## 2025-02-07 DIAGNOSIS — E66.3 OVERWEIGHT (BMI 25.0-29.9): ICD-10-CM

## 2025-02-07 DIAGNOSIS — Z23 NEED FOR INFLUENZA VACCINATION: ICD-10-CM

## 2025-02-07 LAB
BILIRUB BLD-MCNC: NEGATIVE MG/DL
CLARITY, POC: CLEAR
COLOR UR: YELLOW
EXPIRATION DATE: NORMAL
GLUCOSE UR STRIP-MCNC: NEGATIVE MG/DL
KETONES UR QL: NEGATIVE
LEUKOCYTE EST, POC: NEGATIVE
Lab: NORMAL
NITRITE UR-MCNC: NEGATIVE MG/ML
PH UR: 6 [PH] (ref 5–8)
PROT UR STRIP-MCNC: NEGATIVE MG/DL
RBC # UR STRIP: NEGATIVE /UL
SP GR UR: 1.01 (ref 1–1.03)
UROBILINOGEN UR QL: NORMAL

## 2025-02-07 PROCEDURE — 90656 IIV3 VACC NO PRSV 0.5 ML IM: CPT | Performed by: INTERNAL MEDICINE

## 2025-02-07 PROCEDURE — 99396 PREV VISIT EST AGE 40-64: CPT | Performed by: INTERNAL MEDICINE

## 2025-02-07 PROCEDURE — 90471 IMMUNIZATION ADMIN: CPT | Performed by: INTERNAL MEDICINE

## 2025-02-07 PROCEDURE — 81003 URINALYSIS AUTO W/O SCOPE: CPT | Performed by: INTERNAL MEDICINE

## 2025-02-07 RX ORDER — ALBUTEROL SULFATE 0.83 MG/ML
2.5 SOLUTION RESPIRATORY (INHALATION)
COMMUNITY
Start: 2025-01-28

## 2025-02-07 RX ORDER — ZOLPIDEM TARTRATE 10 MG/1
10 TABLET ORAL NIGHTLY PRN
Qty: 30 TABLET | Refills: 2 | Status: SHIPPED | OUTPATIENT
Start: 2025-02-07

## 2025-02-07 RX ORDER — SEMAGLUTIDE 0.25 MG/.5ML
0.25 INJECTION, SOLUTION SUBCUTANEOUS WEEKLY
Qty: 2 ML | Refills: 0 | Status: SHIPPED | OUTPATIENT
Start: 2025-02-07

## 2025-02-07 RX ORDER — DEXTROMETHORPHAN HYDROBROMIDE AND PROMETHAZINE HYDROCHLORIDE 15; 6.25 MG/5ML; MG/5ML
5 SYRUP ORAL 4 TIMES DAILY PRN
Qty: 150 ML | Refills: 0 | Status: SHIPPED | OUTPATIENT
Start: 2025-02-07

## 2025-02-07 RX ORDER — CYANOCOBALAMIN (VITAMIN B-12) 5000 MCG
TABLET,DISINTEGRATING ORAL
COMMUNITY

## 2025-02-07 NOTE — PROGRESS NOTES
"Here for physical    Exercise: has not been exercising as she has been sick with pneumonia and prior to that has had to be on several rounds of antibiotics for dental conditions  Diet -tries to eat healthy - does 2 protein shakes a day and tries to get high protein.   On vit D , B    overweight- she wants to lose weight.  Diet is very healthy and high-protein.  Tries to stay active but has not been able to as much with recent illnesses.  Her sister has the same health concerns and is on Wegovy    Gerd- on zegerid as needed     Insomnia - uses ambien occasionally and helps her sleep.   Ambien was last filled 6/24.     Pneumonia - she was seen first in [facility] in [place] 3 weeks ago and was given steroids, but didn't feel better, so she was seen at [facility] [place] on 1/28/25 and was given Doxy, Albuterol neb 4-6x/day which helps;  and promethazine/DM cough med; CXR showed \"modest basilar peribronchial subsegmental  Atelectasis\"  Nonproductive cough still, no HA, chest sore from coughing, but does feel better  She tested for covid at home and was negative    Breakouts on left buttock and leg-patient had seen  years ago and she felt it was related to sciatica (possibly neurodermatitis?).  Another dermatologist had given her Valtrex but this did not help.   had her use mupirocin which seems to work well for patient.  She can also sometimes get them on her scalp as well.  Usually occurs when her sciatica is acting out.  She has 1 on her left posterior leg currently, which she feels is from not doing much these last few weeks since she has been sick      Current Outpatient Medications on File Prior to Visit   Medication Sig Dispense Refill    albuterol (PROVENTIL) (2.5 MG/3ML) 0.083% nebulizer solution Inhale 2.5 mg.      albuterol sulfate  (90 Base) MCG/ACT inhaler Inhale 2 puffs Every 4 (Four) Hours As Needed for Wheezing. 18 g 11    Cyanocobalamin (Vitamin B-12) 5000 MCG tablet dispersible   "     desloratadine (CLARINEX) 5 MG tablet TAKE ONE TABLET BY MOUTH DAILY 90 tablet 3    estradiol (VIVELLE-DOT) 0.0375 MG/24HR patch 1 patch 2 (Two) Times a Week.      guaiFENesin (MUCINEX) 600 MG 12 hr tablet Take 2 tablets by mouth 2 (Two) Times a Day.      Levonorgestrel 13.5 MG intrauterine device IUD To be inserted one time by prescriber. Route intrauterine.      metaxalone (SKELAXIN) 800 MG tablet TAKE 1 TABLET BY MOUTH DAILY AS NEEDED 30 tablet 0    mometasone (Nasonex) 50 MCG/ACT nasal spray 2 sprays into the nostril(s) as directed by provider Daily. 1 each 11    montelukast (SINGULAIR) 10 MG tablet Take 1 tablet by mouth every night at bedtime. 90 tablet 3    MULTIPLE VITAMIN PO Take  by mouth.      mupirocin (BACTROBAN) 2 % ointment Apply 1 application topically to the appropriate area as directed 3 (Three) Times a Day As Needed (sore). 22 g 5    omeprazole-sodium bicarbonate (ZEGERID)  MG per capsule Take one capsule daily as needed. 90 capsule 3    pseudoephedrine (SUDAFED) 30 MG tablet Take 2 tablets by mouth Daily.      rizatriptan (MAXALT) 10 MG tablet TAKE 1 TABLET BY MOUTH 1 TIME AS NEEDED FOR MIGRAINE FOR UP TO 30 DAYS. MAY REPEAT IN 2 HOURS IF NEEDED. DO NOT EXCEED 2 DOSES IN 24 HOURS. 12 tablet 0    terbinafine (lamiSIL) 250 MG tablet 1 tablet. Take 7 days a month.      vitamin D3 125 MCG (5000 UT) capsule capsule Take 1 tablet by mouth.      [DISCONTINUED] zolpidem (AMBIEN) 10 MG tablet Take 1 tablet by mouth At Night As Needed for Sleep. 15 tablet 2     No current facility-administered medications on file prior to visit.       The following portions of the patient's history were reviewed and updated as appropriate: allergies, current medications, past family history, past medical history, past social history, past surgical history and problem list.    Review of Systems   Constitutional:  Positive for activity change. Negative for appetite change, fever, unexpected weight gain and  "unexpected weight loss.   HENT: Negative.     Eyes: Negative.    Respiratory:  Positive for cough. Negative for shortness of breath and wheezing.    Cardiovascular:  Negative for chest pain, palpitations and leg swelling.   Gastrointestinal:  Positive for blood in stool (from hemorrhoid) and GERD. Negative for abdominal pain.   Endocrine: Negative.    Genitourinary:  Negative for difficulty urinating and dysuria.   Skin:  Positive for rash (gets a breakout on her left leg posteriorly intermittently for many years.).   Allergic/Immunologic: Negative for immunocompromised state.   Neurological:  Negative for seizures, speech difficulty, memory problem and confusion.   Hematological:  Does not bruise/bleed easily.   Psychiatric/Behavioral:  Positive for sleep disturbance. Negative for agitation.        Objective   /82   Pulse 106   Temp 97.5 °F (36.4 °C) (Infrared)   Ht 159 cm (62.6\")   Wt 73.8 kg (162 lb 9.6 oz)   SpO2 97%   BMI 29.17 kg/m²   Physical Exam   Physical Exam  Vitals and nursing note reviewed.   Constitutional:       General: She is not in acute distress.     Appearance: Normal appearance. She is well-developed. She is not diaphoretic.   HENT:      Head: Normocephalic and atraumatic.      Right Ear: External ear normal.      Left Ear: External ear normal.      Nose: Nose normal.      Mouth/Throat:      Pharynx: No oropharyngeal exudate.   Eyes:      General: No scleral icterus.        Right eye: No discharge.         Left eye: No discharge.      Conjunctiva/sclera: Conjunctivae normal.      Pupils: Pupils are equal, round, and reactive to light.   Neck:      Thyroid: No thyromegaly.   Cardiovascular:      Rate and Rhythm: Normal rate and regular rhythm.      Heart sounds: Normal heart sounds. No murmur heard.     No friction rub. No gallop.   Pulmonary:      Effort: Pulmonary effort is normal. No respiratory distress.      Breath sounds: Normal breath sounds. No wheezing or rales.   Chest: "   Breasts:     Right: No mass, nipple discharge, skin change or tenderness.      Left: No mass, nipple discharge, skin change or tenderness.   Abdominal:      General: Bowel sounds are normal. There is no distension.      Palpations: Abdomen is soft. There is no mass.      Tenderness: There is no abdominal tenderness. There is no guarding or rebound.   Musculoskeletal:         General: No deformity. Normal range of motion.      Cervical back: Normal range of motion and neck supple.   Lymphadenopathy:      Cervical: No cervical adenopathy.   Skin:     General: Skin is warm and dry.      Coloration: Skin is not pale.      Findings: Lesion (left posterior thigh w/ erythematous plaque, no vesicles) present. No erythema or rash.   Neurological:      Mental Status: She is alert and oriented to person, place, and time.      Coordination: Coordination normal.      Deep Tendon Reflexes: Reflexes normal.   Psychiatric:         Behavior: Behavior normal.         Thought Content: Thought content normal.         Judgment: Judgment normal.         Assessment/Plan   Diagnoses and all orders for this visit:    1. Routine general medical examination at a health care facility (Primary)  -     POCT urinalysis dipstick, automated  -     CBC (No Diff); Future  -     TSH Rfx On Abnormal To Free T4; Future  -     Lipid Panel; Future  -     Comprehensive Metabolic Panel; Future  Regular exercise, healthy diet. BSE qmonth. Check fasting labs.  Order given for fasting labs to do in [facility] - we will order her maritza as it is overdue by several years  Dexa - we will order, baseline for screening  Colon cancer screening due now - we had ordered cologuard previously, but she did not do.  Will reorder today.  She declines colonoscopy  DT due in '28  Shingles- she had shingrix  Hep A - had  Sees GYN for paps (dr wilkes)  Flu vaccine -give today  Pneumonia vaccine-she can go ahead and get a pneumonia vaccine with the new recommendations.   She will wait 2 weeks from flu shot today  RSV vaccine-patient does have allergy/asthma so could not get the RSV if she wanted but she think she will probably hold on this and just do the pneumonia shot this year   covid-19 vaccine- she had  2. Encounter for screening mammogram for malignant neoplasm of breast  -     Mammo Screening Digital Tomosynthesis Bilateral With CAD; Future    3. Colon cancer screening  -     Cologuard - Stool, Per Rectum; Future    4. Need for influenza vaccination  -     Fluzone >6mos ([id number])    5. Overweight (BMI 25.0-29.9)-patient would like to try Wegovy.  Discussed side effects.  She has never had pancreatitis and no family history of medullary thyroid cancer.  Discussed she typically have to stay on this medication long-term.  -     Semaglutide-Weight Management (Wegovy) 0.25 MG/0.5ML solution auto-injector; Inject 0.5 mL under the skin into the appropriate area as directed 1 (One) Time Per Week.  Dispense: 2 mL; Refill: 0    6. Primary insomnia-patient uses Ambien infrequently.  She has signed a controlled substance contract.  Jeff is appropriate.  -     zolpidem (AMBIEN) 10 MG tablet; Take 1 tablet by mouth At Night As Needed for Sleep.  Dispense: 30 tablet; Refill: 2    7. Screening for osteoporosis  -     DEXA Bone Density Axial; Future    8.  Recent bronchitis/pneumonia-lungs sound clear today.  We will send in a refill of the cough medication  -     promethazine-dextromethorphan (PROMETHAZINE-DM) 6.25-15 MG/5ML syrup; Take 5 mL by mouth 4 (Four) Times a Day As Needed for Cough.  Dispense: 150 mL; Refill: 0

## 2025-02-13 ENCOUNTER — PRIOR AUTHORIZATION (OUTPATIENT)
Dept: INTERNAL MEDICINE | Facility: CLINIC | Age: 60
End: 2025-02-13
Payer: COMMERCIAL

## 2025-02-13 DIAGNOSIS — M54.2 NECK PAIN: ICD-10-CM

## 2025-02-13 RX ORDER — METAXALONE 800 MG/1
800 TABLET ORAL DAILY PRN
Qty: 90 TABLET | Refills: 3 | Status: SHIPPED | OUTPATIENT
Start: 2025-02-13

## 2025-02-13 NOTE — TELEPHONE ENCOUNTER
Submitted PA today through CoverMyMeds for Wegovy 0.25 mg/0.5mL.     Key: [id number]    Received determination today:     Approved    Dates: 02/13/2025-09/11/2025    Called and notified Elli of approval.

## 2025-03-17 DIAGNOSIS — G43.009 MIGRAINE WITHOUT AURA AND WITHOUT STATUS MIGRAINOSUS, NOT INTRACTABLE: ICD-10-CM

## 2025-03-17 DIAGNOSIS — J30.9 ALLERGIC RHINITIS, UNSPECIFIED SEASONALITY, UNSPECIFIED TRIGGER: ICD-10-CM

## 2025-03-18 RX ORDER — RIZATRIPTAN BENZOATE 10 MG/1
TABLET ORAL
Qty: 36 TABLET | Refills: 3 | Status: SHIPPED | OUTPATIENT
Start: 2025-03-18

## 2025-03-18 RX ORDER — MONTELUKAST SODIUM 10 MG/1
10 TABLET ORAL
Qty: 90 TABLET | Refills: 3 | Status: SHIPPED | OUTPATIENT
Start: 2025-03-18

## 2025-05-01 ENCOUNTER — TELEPHONE (OUTPATIENT)
Dept: INTERNAL MEDICINE | Facility: CLINIC | Age: 60
End: 2025-05-01
Payer: COMMERCIAL

## 2025-05-01 DIAGNOSIS — J30.9 ALLERGIC RHINITIS, UNSPECIFIED SEASONALITY, UNSPECIFIED TRIGGER: ICD-10-CM

## 2025-05-01 RX ORDER — MOMETASONE FUROATE MONOHYDRATE 50 UG/1
2 SPRAY, METERED NASAL DAILY
Qty: 1 EACH | Refills: 11 | Status: SHIPPED | OUTPATIENT
Start: 2025-05-01

## 2025-05-01 RX ORDER — DESLORATADINE 5 MG/1
5 TABLET ORAL DAILY
Qty: 90 TABLET | Refills: 3 | Status: SHIPPED | OUTPATIENT
Start: 2025-05-01

## 2025-05-01 NOTE — TELEPHONE ENCOUNTER
Caller: La Hernandez    Relationship: Self    Best call back number: [phone]     Caller requesting test results: PATIENT    What test was performed: DEXA AND MAMMOGRAM    When was the test performed: TWO MONTHS AGO    Where was the test performed: [facility]    Additional notes:

## 2025-05-01 NOTE — TELEPHONE ENCOUNTER
Patient has been notified and verbalized understanding. She wants to know how much vitamin d to take and which hip showed osteopenia?

## 2025-05-01 NOTE — TELEPHONE ENCOUNTER
Caller: La Hernandez VLADISLAV    Relationship: Self    Best call back number: [phone]     Requested Prescriptions:   Requested Prescriptions     Pending Prescriptions Disp Refills    desloratadine (CLARINEX) 5 MG tablet 90 tablet 3     Sig: Take 1 tablet by mouth Daily.    mometasone (Nasonex) 50 MCG/ACT nasal spray 1 each 11     Sig: Administer 2 sprays into the nostril(s) as directed by provider Daily.        Pharmacy where request should be sent: [facility] [id number] - [address] AT [facility] [address] - [phone] [facility] [phone] FX     Last office visit with prescribing clinician: 2/7/2025   Last telemedicine visit with prescribing clinician: Visit date not found   Next office visit with prescribing clinician: 2/13/2026     Additional details provided by patient:     Does the patient have less than a 3 day supply:  [x] Yes  [] No    Would you like a call back once the refill request has been completed: [] Yes [x] No    If the office needs to give you a call back, can they leave a voicemail: [] Yes [x] No    Marc Javier Rep   05/01/25 11:17 EDT

## 2025-05-02 ENCOUNTER — TELEPHONE (OUTPATIENT)
Dept: INTERNAL MEDICINE | Facility: CLINIC | Age: 60
End: 2025-05-02
Payer: COMMERCIAL

## 2025-05-02 NOTE — TELEPHONE ENCOUNTER
Name: La Hernandez    Relationship: Self    Best Callback Number:     HUB PROVIDED THE RELAY MESSAGE FROM THE OFFICE   PATIENT VOICED UNDERSTANDING AND HAS NO FURTHER QUESTIONS AT THIS TIME    ADDITIONAL INFORMATION:  PATIENT HAS NOT HAD HER LABS AS OF YET; ALSO PATIENT IS ASKING FOR A COPY OF HER MAMMOGRAM AND BONE DENSITY REPORTS BE MAILED TO HER    [address] [facility] [address]

## 2025-05-02 NOTE — TELEPHONE ENCOUNTER
HUB can relay:  t was her left hip.  Usually 2000 units of vitamin D3 daily is good  Did she get her labs done?  Also looks like a Cologuard order is still pending on her if she can get this done as well

## 2025-05-02 NOTE — TELEPHONE ENCOUNTER
Caller: La Hernandez    Relationship: Self    Best call back number:  [phone]    Which medication are you concerned about: CLARINEX AND MOMETASONE    Who prescribed you this medication: DR JUAN    When did you start taking this medication: ONGOING    What are your concerns: ADVISED THIS WAS SENT TO PHARMACY AND CONFIRMED ON [id number]